# Patient Record
Sex: FEMALE | Race: BLACK OR AFRICAN AMERICAN | Employment: FULL TIME | URBAN - METROPOLITAN AREA
[De-identification: names, ages, dates, MRNs, and addresses within clinical notes are randomized per-mention and may not be internally consistent; named-entity substitution may affect disease eponyms.]

---

## 2017-01-20 PROCEDURE — 87340 HEPATITIS B SURFACE AG IA: CPT | Performed by: OBSTETRICS & GYNECOLOGY

## 2017-01-20 PROCEDURE — 86780 TREPONEMA PALLIDUM: CPT | Performed by: OBSTETRICS & GYNECOLOGY

## 2017-01-20 PROCEDURE — 87389 HIV-1 AG W/HIV-1&-2 AB AG IA: CPT | Performed by: OBSTETRICS & GYNECOLOGY

## 2017-01-20 PROCEDURE — 87591 N.GONORRHOEAE DNA AMP PROB: CPT | Performed by: OBSTETRICS & GYNECOLOGY

## 2017-01-20 PROCEDURE — 36415 COLL VENOUS BLD VENIPUNCTURE: CPT | Performed by: OBSTETRICS & GYNECOLOGY

## 2017-01-20 PROCEDURE — 87491 CHLMYD TRACH DNA AMP PROBE: CPT | Performed by: OBSTETRICS & GYNECOLOGY

## 2018-01-09 ENCOUNTER — APPOINTMENT (OUTPATIENT)
Dept: OCCUPATIONAL MEDICINE | Age: 38
End: 2018-01-09
Attending: EMERGENCY MEDICINE

## 2018-03-30 ENCOUNTER — HOSPITAL ENCOUNTER (EMERGENCY)
Facility: HOSPITAL | Age: 38
Discharge: HOME | End: 2018-03-30
Payer: MEDICAID

## 2018-03-30 ENCOUNTER — APPOINTMENT (OUTPATIENT)
Dept: GENERAL RADIOLOGY | Facility: HOSPITAL | Age: 38
End: 2018-03-30
Payer: MEDICAID

## 2018-03-30 VITALS
TEMPERATURE: 98 F | OXYGEN SATURATION: 97 % | DIASTOLIC BLOOD PRESSURE: 80 MMHG | HEART RATE: 80 BPM | WEIGHT: 245 LBS | BODY MASS INDEX: 38.45 KG/M2 | RESPIRATION RATE: 16 BRPM | SYSTOLIC BLOOD PRESSURE: 133 MMHG | HEIGHT: 67 IN

## 2018-03-30 DIAGNOSIS — M25.561 ACUTE PAIN OF RIGHT KNEE: Primary | ICD-10-CM

## 2018-03-30 PROCEDURE — 99283 EMERGENCY DEPT VISIT LOW MDM: CPT

## 2018-03-30 PROCEDURE — 73560 X-RAY EXAM OF KNEE 1 OR 2: CPT

## 2018-03-31 NOTE — ED INITIAL ASSESSMENT (HPI)
Pt c/o right knee pain, reports ongoing pain since November after injury, states she twisted knee earlier today while shopping.

## 2018-03-31 NOTE — ED NOTES
Assumed care of patient from [facility]OLA [facility]. Discharge instructions/new RX/follow up plan reviewed with patient. She verbalizes understanding and leaves ED in NAD.

## 2018-03-31 NOTE — ED PROVIDER NOTES
Patient Seen in: [facility] AND [facility] Emergency Department    History   Patient presents with:  Knee Pain      HPI    Patient presents to the ED complaining of right knee pain that has been ongoing since November.   No known injury November, pain started spo signs reviewed. Social History and Family History elements reviewed from today, pertinent positives to the presenting problem noted.     Physical Exam     ED Triage Vitals  BP: 133/80 [03/30/18 2124]  Pulse: 83 [03/30/18 2123]  Resp: 18 [03/30/18 2123] blood pressure reading without diagnosis of hypertension on her problem list. to contribute to the complexity of this ED evaluation.     ED Course: Patient presents to the ED with right knee pain, no distress on examination, x-rays negative for acute bony i

## 2018-05-19 ENCOUNTER — OFFICE VISIT (OUTPATIENT)
Dept: INTERNAL MEDICINE CLINIC | Facility: CLINIC | Age: 38
End: 2018-05-19

## 2018-05-19 ENCOUNTER — LAB ENCOUNTER (OUTPATIENT)
Dept: LAB | Facility: HOSPITAL | Age: 38
End: 2018-05-19
Attending: INTERNAL MEDICINE
Payer: MEDICAID

## 2018-05-19 VITALS
HEIGHT: 67.5 IN | SYSTOLIC BLOOD PRESSURE: 132 MMHG | BODY MASS INDEX: 37.76 KG/M2 | RESPIRATION RATE: 20 BRPM | WEIGHT: 243.38 LBS | TEMPERATURE: 98 F | DIASTOLIC BLOOD PRESSURE: 84 MMHG | HEART RATE: 82 BPM

## 2018-05-19 DIAGNOSIS — Z00.00 ROUTINE PHYSICAL EXAMINATION: ICD-10-CM

## 2018-05-19 DIAGNOSIS — M25.561 RIGHT KNEE PAIN, UNSPECIFIED CHRONICITY: ICD-10-CM

## 2018-05-19 DIAGNOSIS — Z00.00 ROUTINE PHYSICAL EXAMINATION: Primary | ICD-10-CM

## 2018-05-19 PROCEDURE — 85025 COMPLETE CBC W/AUTO DIFF WBC: CPT

## 2018-05-19 PROCEDURE — 80061 LIPID PANEL: CPT

## 2018-05-19 PROCEDURE — 82607 VITAMIN B-12: CPT

## 2018-05-19 PROCEDURE — 36415 COLL VENOUS BLD VENIPUNCTURE: CPT

## 2018-05-19 PROCEDURE — 99395 PREV VISIT EST AGE 18-39: CPT | Performed by: INTERNAL MEDICINE

## 2018-05-19 PROCEDURE — 87389 HIV-1 AG W/HIV-1&-2 AB AG IA: CPT

## 2018-05-19 PROCEDURE — 80053 COMPREHEN METABOLIC PANEL: CPT

## 2018-05-19 PROCEDURE — 84443 ASSAY THYROID STIM HORMONE: CPT

## 2018-05-19 RX ORDER — ERGOCALCIFEROL (VITAMIN D2) 10 MCG
400 TABLET ORAL
COMMUNITY
End: 2018-09-06 | Stop reason: ALTCHOICE

## 2018-05-19 RX ORDER — HYDROCODONE BITARTRATE AND ACETAMINOPHEN 5; 325 MG/1; MG/1
1 TABLET ORAL
COMMUNITY
Start: 2015-01-04 | End: 2018-05-19 | Stop reason: ALTCHOICE

## 2018-05-19 NOTE — PROGRESS NOTES
HPI:    Patient ID: Blanco Silva is a 44year old female. HPI  Patient is here requesting a physical exam.  The first time that I have seen her. She saw the gynecologist in January.   In November she was on an airplane flight and developed right knee Packs/day: 0.00      Years: 0.00         Types: Cigarettes  Smokeless tobacco: Never Used                      Alcohol use:  Yes                        Review of Systems   Constitutional: Negative for chills, fa is no tenderness. Musculoskeletal: She exhibits no tenderness or effusion. Lymphadenopathy:     She has no cervical adenopathy. Neurological: She is alert. No cranial nerve deficit. Coordination normal.   Skin: Skin is warm and dry. No rash noted.

## 2018-06-11 ENCOUNTER — OFFICE VISIT (OUTPATIENT)
Dept: ORTHOPEDICS CLINIC | Facility: CLINIC | Age: 38
End: 2018-06-11

## 2018-06-11 DIAGNOSIS — S83.8X1A ACUTE MEDIAL MENISCAL INJURY OF RIGHT KNEE, INITIAL ENCOUNTER: Primary | ICD-10-CM

## 2018-06-11 PROCEDURE — 99243 OFF/OP CNSLTJ NEW/EST LOW 30: CPT | Performed by: ORTHOPAEDIC SURGERY

## 2018-06-11 PROCEDURE — 99212 OFFICE O/P EST SF 10 MIN: CPT | Performed by: ORTHOPAEDIC SURGERY

## 2018-06-11 NOTE — H&P
Chief Complaint: Right knee pain    NURSING INTAKE COMMENTS: Patient presents with:  Consult: Had a knee injury months ago . Got off an airplane and was painful. Right knee. Does not remember any significant injury at the time.    Other: Had an xray in the electronic medical record. Pertinent positives and negatives noted in the the HPI. Physical Examination:  There is no height or weight on file to calculate BMI. This 44year old female is A&O in no acute distress.   KNEE EXAM: LEFT  RIGHT   Range of M

## 2018-06-14 ENCOUNTER — TELEPHONE (OUTPATIENT)
Dept: ORTHOPEDICS CLINIC | Facility: CLINIC | Age: 38
End: 2018-06-14

## 2018-06-14 NOTE — TELEPHONE ENCOUNTER
Pt for MRI  Called [employer] for authrozation  Spoke to Democracia [id number] authorized  Expires 7-29-18  [id number]

## 2018-06-25 ENCOUNTER — HOSPITAL ENCOUNTER (OUTPATIENT)
Dept: MRI IMAGING | Age: 38
Discharge: HOME | End: 2018-06-25
Attending: ORTHOPAEDIC SURGERY
Payer: MEDICAID

## 2018-06-25 DIAGNOSIS — S83.8X1A ACUTE MEDIAL MENISCAL INJURY OF RIGHT KNEE, INITIAL ENCOUNTER: ICD-10-CM

## 2018-06-25 PROCEDURE — 73721 MRI JNT OF LWR EXTRE W/O DYE: CPT | Performed by: ORTHOPAEDIC SURGERY

## 2018-06-26 ENCOUNTER — TELEPHONE (OUTPATIENT)
Dept: ORTHOPEDICS CLINIC | Facility: CLINIC | Age: 38
End: 2018-06-26

## 2018-09-06 ENCOUNTER — OFFICE VISIT (OUTPATIENT)
Dept: ORTHOPEDICS CLINIC | Facility: CLINIC | Age: 38
End: 2018-09-06
Payer: MEDICAID

## 2018-09-06 DIAGNOSIS — S83.8X1A ACUTE MEDIAL MENISCAL INJURY OF RIGHT KNEE, INITIAL ENCOUNTER: Primary | ICD-10-CM

## 2018-09-06 PROCEDURE — 99213 OFFICE O/P EST LOW 20 MIN: CPT | Performed by: ORTHOPAEDIC SURGERY

## 2018-09-06 PROCEDURE — 99212 OFFICE O/P EST SF 10 MIN: CPT | Performed by: ORTHOPAEDIC SURGERY

## 2018-09-06 NOTE — PROGRESS NOTES
Time based billing: total face-to-face time spent examining, counseling and treating this patient 15 minutes; more than 50% of time spent in counseling/coordination of care    Patient presents for follow-up.   She continues to have significant pain and jaquan

## 2020-01-24 ENCOUNTER — APPOINTMENT (OUTPATIENT)
Dept: CT IMAGING | Facility: HOSPITAL | Age: 40
End: 2020-01-24
Attending: EMERGENCY MEDICINE
Payer: COMMERCIAL

## 2020-01-24 ENCOUNTER — HOSPITAL ENCOUNTER (EMERGENCY)
Facility: HOSPITAL | Age: 40
Discharge: HOME | End: 2020-01-25
Attending: EMERGENCY MEDICINE
Payer: COMMERCIAL

## 2020-01-24 VITALS
DIASTOLIC BLOOD PRESSURE: 70 MMHG | HEART RATE: 62 BPM | TEMPERATURE: 98 F | WEIGHT: 250 LBS | BODY MASS INDEX: 39 KG/M2 | RESPIRATION RATE: 18 BRPM | SYSTOLIC BLOOD PRESSURE: 130 MMHG | OXYGEN SATURATION: 98 %

## 2020-01-24 DIAGNOSIS — R09.89 FOREIGN BODY SENSATION IN THROAT: Primary | ICD-10-CM

## 2020-01-24 PROCEDURE — 70490 CT SOFT TISSUE NECK W/O DYE: CPT | Performed by: EMERGENCY MEDICINE

## 2020-01-24 PROCEDURE — 99284 EMERGENCY DEPT VISIT MOD MDM: CPT

## 2020-01-25 NOTE — ED PROVIDER NOTES
Patient Seen in: [facility] AND [facility] Emergency Department      History   Patient presents with:  FB in Throat    Stated Complaint: feels like lump in throat x 3 weeks    HPI    58-year-old female presents to the ER with complaint of foreign body sensation 11/11/2012   SpO2 98%   BMI 38.58 kg/m²         Physical Exam  Vitals signs and nursing note reviewed. Constitutional:       Appearance: Normal appearance. She is normal weight. HENT:      Head: Normocephalic and atraumatic.       Nose: Nose normal.   E

## 2020-01-25 NOTE — ED INITIAL ASSESSMENT (HPI)
Patient aox3 to ed via self patient co of foreign body sensation in lower throat x 3 weeks, patient states feels like it has increased in size. Airway patent no drooling noted. Patient speaking in clear full sentences.  Denies fever

## 2020-01-30 ENCOUNTER — OFFICE VISIT (OUTPATIENT)
Dept: INTERNAL MEDICINE CLINIC | Facility: CLINIC | Age: 40
End: 2020-01-30
Payer: COMMERCIAL

## 2020-01-30 VITALS
SYSTOLIC BLOOD PRESSURE: 144 MMHG | HEIGHT: 67.5 IN | HEART RATE: 75 BPM | DIASTOLIC BLOOD PRESSURE: 91 MMHG | BODY MASS INDEX: 39.07 KG/M2 | WEIGHT: 251.88 LBS

## 2020-01-30 DIAGNOSIS — Z00.00 PHYSICAL EXAM, ANNUAL: ICD-10-CM

## 2020-01-30 DIAGNOSIS — R07.0 THROAT PAIN: ICD-10-CM

## 2020-01-30 DIAGNOSIS — R13.10 DYSPHAGIA, UNSPECIFIED TYPE: Primary | ICD-10-CM

## 2020-01-30 PROCEDURE — 99213 OFFICE O/P EST LOW 20 MIN: CPT | Performed by: NURSE PRACTITIONER

## 2020-01-30 NOTE — PROGRESS NOTES
HPI:    Patient ID: Maco Chapman is a 35year old female. HPI Feels like there is something caught in her throat. Patient went to the ED with a sensation of a foreign body in her throat. This sensation had been going on for a month.  Patient feels th Dysphagia - Primary    Relevant Orders    US THYROID (EYX=[id number])    ENT - INTERNAL       Other    Throat pain     A/P-35year-old -American female who went to the emergency room on 1/24/2020 with complaints that there is something caught in her thro

## 2020-01-31 NOTE — ASSESSMENT & PLAN NOTE
A/P-35year-old -American female who went to the emergency room on 1/24/2020 with complaints that there is something caught in her throat. They did a's CAT scan of her neck and soft tissue and it was unremarkable.   This sensation is been going on f

## 2020-02-25 ENCOUNTER — HOSPITAL ENCOUNTER (OUTPATIENT)
Dept: ULTRASOUND IMAGING | Age: 40
Discharge: HOME | End: 2020-02-25
Attending: NURSE PRACTITIONER
Payer: COMMERCIAL

## 2020-02-25 DIAGNOSIS — R13.10 DYSPHAGIA, UNSPECIFIED TYPE: ICD-10-CM

## 2020-02-25 PROCEDURE — 76536 US EXAM OF HEAD AND NECK: CPT | Performed by: NURSE PRACTITIONER

## 2020-02-27 ENCOUNTER — OFFICE VISIT (OUTPATIENT)
Dept: OTOLARYNGOLOGY | Facility: CLINIC | Age: 40
End: 2020-02-27
Payer: COMMERCIAL

## 2020-02-27 VITALS
BODY MASS INDEX: 39.24 KG/M2 | HEIGHT: 67 IN | DIASTOLIC BLOOD PRESSURE: 88 MMHG | TEMPERATURE: 98 F | WEIGHT: 250 LBS | SYSTOLIC BLOOD PRESSURE: 131 MMHG

## 2020-02-27 DIAGNOSIS — R07.0 THROAT PAIN: Primary | ICD-10-CM

## 2020-02-27 PROCEDURE — 31575 DIAGNOSTIC LARYNGOSCOPY: CPT | Performed by: OTOLARYNGOLOGY

## 2020-02-27 PROCEDURE — 99203 OFFICE O/P NEW LOW 30 MIN: CPT | Performed by: OTOLARYNGOLOGY

## 2020-02-27 RX ORDER — OMEPRAZOLE 40 MG/1
40 CAPSULE, DELAYED RELEASE ORAL DAILY
Qty: 30 CAPSULE | Refills: 2 | Status: SHIPPED | OUTPATIENT
Start: 2020-02-27 | End: 2021-12-27

## 2020-02-27 RX ORDER — AZELASTINE 1 MG/ML
2 SPRAY, METERED NASAL 2 TIMES DAILY
Qty: 1 BOTTLE | Refills: 0 | Status: SHIPPED | OUTPATIENT
Start: 2020-02-27 | End: 2021-12-27

## 2020-02-27 RX ORDER — MONTELUKAST SODIUM 10 MG/1
10 TABLET ORAL NIGHTLY
Qty: 30 TABLET | Refills: 3 | Status: SHIPPED | OUTPATIENT
Start: 2020-02-27 | End: 2021-12-27

## 2020-02-27 NOTE — PROGRESS NOTES
Chelsea Rodriguez is a 35year old female.   Patient presents with:  Throat Problem: pt presents with a feeling something is stuck in throat       HISTORY OF PRESENT ILLNESS  She presents with a history of feeling something stuck in the back of her th vessels of lower extremity    • Bunion    • Elevated blood pressure reading without diagnosis of hypertension    • Other pulmonary embolism and infarction 2012    After hysterectomy.  Rx'd with blood thinner for 6 months   • Sprain and strain of unspecified Normal External nose - Normal. Lips/teeth/gums - Normal. Tonsils - Normal. Oropharynx - Normal.   Ears Normal Inspection - Right: Normal, Left: Normal. Canal - Right: Normal, Left: Normal. TM - Right: Normal, Left: Normal.   Skin Normal Inspection - Normal laryngeal mucosa and vocal cords noted. Significant postnasal discharge witnessed on laryngoscopy. She does have a history of GERD. I have recommended that she start Singulair Loratadine-D Astelin nasal spray and omeprazole return to see me in 1 month.

## 2020-03-14 ENCOUNTER — OFFICE VISIT (OUTPATIENT)
Dept: INTERNAL MEDICINE CLINIC | Facility: CLINIC | Age: 40
End: 2020-03-14
Payer: COMMERCIAL

## 2020-03-14 VITALS
SYSTOLIC BLOOD PRESSURE: 118 MMHG | RESPIRATION RATE: 20 BRPM | DIASTOLIC BLOOD PRESSURE: 76 MMHG | TEMPERATURE: 99 F | HEART RATE: 92 BPM | HEIGHT: 67 IN | BODY MASS INDEX: 38.77 KG/M2 | WEIGHT: 247 LBS

## 2020-03-14 DIAGNOSIS — Z00.00 PHYSICAL EXAM, ANNUAL: Primary | ICD-10-CM

## 2020-03-14 DIAGNOSIS — G47.9 SLEEP DISTURBANCE: ICD-10-CM

## 2020-03-14 PROCEDURE — 99396 PREV VISIT EST AGE 40-64: CPT | Performed by: INTERNAL MEDICINE

## 2020-03-14 NOTE — PROGRESS NOTES
HPI:    Patient ID: Cameron Salazar is a 35year old female. HPI  Patient is here requesting physical exam.  I last saw her couple of years ago. Recently seen for dysphasia.   Had a CT scan in the emergency room which was normal.  Had an [place] CHOLECYSTECTOMY      at age 24   • HYSTERECTOMY  in 2012     abdominal hysterectomy  pt still has fallobian tubes and ovaries       Family History   Problem Relation Age of Onset   • Heart Disease Mother    • Heart Disease Other    • Cancer Other    • Diab 118/76 (BP Location: Right arm, Patient Position: Sitting, Cuff Size: large)   Pulse 92   Temp 99.2 °F (37.3 °C) (Oral)   Resp 20   Ht 5' 7\" (1.702 m)   Wt 247 lb (112 kg)   LMP 11/11/2012   BMI 38.69 kg/m²      Physical Exam    Constitutional: She is obe REFERRAL DIAGOSTIC SLEEP STUDY ADULT  - GENERAL SLEEP STUDY TRANSCRIPTION; Future    3. BMI 38.0-38.9,adult  Spent time discussing the importance of long-term commitment to diet, exercise, weight loss. She is been doing some exercise.   She is to focus mor

## 2020-03-24 ENCOUNTER — TELEPHONE (OUTPATIENT)
Dept: INTERNAL MEDICINE CLINIC | Facility: CLINIC | Age: 40
End: 2020-03-24

## 2020-03-24 DIAGNOSIS — G47.33 OSA (OBSTRUCTIVE SLEEP APNEA): Primary | ICD-10-CM

## 2020-03-24 NOTE — TELEPHONE ENCOUNTER
Hello,     Patient’s Health Plan requires the following information listed below in order to obtain PA for in lab sleep study. Please note that last office note does not include this information.  Please refer patient to Pulmonary or patient can have a home

## 2020-03-25 ENCOUNTER — TELEPHONE (OUTPATIENT)
Dept: OTOLARYNGOLOGY | Facility: CLINIC | Age: 40
End: 2020-03-25

## 2020-03-25 NOTE — TELEPHONE ENCOUNTER
Please note reason for call and advise, ok to order home sleep study? Order for home sleep study pending in EMR.

## 2020-03-25 NOTE — TELEPHONE ENCOUNTER
Patient contacted informed her that order was placed for home sleep center. Patient also given phone number for [facility]. Patient voiced understanding with no concerns.

## 2020-03-30 NOTE — TELEPHONE ENCOUNTER
Hello,    Please note that [facility]'s health plan  requirements for home sleep study are the same as in lab sleep study. Please refer patient to Pulmonary. Thank you, Jb Pineda Specialist    Managed Care.

## 2020-04-01 ENCOUNTER — PATIENT MESSAGE (OUTPATIENT)
Dept: INTERNAL MEDICINE CLINIC | Facility: CLINIC | Age: 40
End: 2020-04-01

## 2020-04-02 RX ORDER — ERGOCALCIFEROL 1.25 MG/1
50000 CAPSULE ORAL WEEKLY
Qty: 12 CAPSULE | Refills: 0 | Status: SHIPPED | OUTPATIENT
Start: 2020-04-02 | End: 2020-05-02

## 2020-04-02 NOTE — TELEPHONE ENCOUNTER
From: Shannan Holland  To: Marisol العراقي MD  Sent: 4/1/2020 1:35 PM CDT  Subject: Test Results Question    Good afternoon,     I am concerned that no one has reached out to provide the results of my blood test. Today I had lab jane resend them vi

## 2020-04-02 NOTE — TELEPHONE ENCOUNTER
Patient called and lab reviewed. (From [facility]). Will start on Vitamin D 50,000 units weekly for 12 weeks.   Repeat Vitamin D level in August.    Sharlene Keen, ANP

## 2020-04-02 NOTE — TELEPHONE ENCOUNTER
Routed to JESSICA Enrique for advise, thanks. Site staff pls f/u on lab results from [facility].   Thanks.

## 2020-10-06 ENCOUNTER — HOSPITAL ENCOUNTER (OUTPATIENT)
Dept: MAMMOGRAPHY | Age: 40
Discharge: HOME | End: 2020-10-06
Attending: OBSTETRICS & GYNECOLOGY
Payer: COMMERCIAL

## 2020-10-06 DIAGNOSIS — Z01.419 ENCOUNTER FOR GYNECOLOGICAL EXAMINATION WITHOUT ABNORMAL FINDING: ICD-10-CM

## 2020-10-06 DIAGNOSIS — Z12.31 ENCOUNTER FOR SCREENING MAMMOGRAM FOR MALIGNANT NEOPLASM OF BREAST: ICD-10-CM

## 2020-10-06 PROCEDURE — 77067 SCR MAMMO BI INCL CAD: CPT | Performed by: OBSTETRICS & GYNECOLOGY

## 2020-10-06 PROCEDURE — 77063 BREAST TOMOSYNTHESIS BI: CPT | Performed by: OBSTETRICS & GYNECOLOGY

## 2020-10-20 ENCOUNTER — HOSPITAL ENCOUNTER (OUTPATIENT)
Dept: MAMMOGRAPHY | Facility: HOSPITAL | Age: 40
Discharge: HOME | End: 2020-10-20
Attending: OBSTETRICS & GYNECOLOGY
Payer: COMMERCIAL

## 2020-10-20 ENCOUNTER — HOSPITAL ENCOUNTER (OUTPATIENT)
Dept: ULTRASOUND IMAGING | Facility: HOSPITAL | Age: 40
Discharge: HOME | End: 2020-10-20
Attending: OBSTETRICS & GYNECOLOGY
Payer: COMMERCIAL

## 2020-10-20 DIAGNOSIS — R92.8 ABNORMAL MAMMOGRAM: ICD-10-CM

## 2020-10-20 PROCEDURE — 76642 ULTRASOUND BREAST LIMITED: CPT | Performed by: OBSTETRICS & GYNECOLOGY

## 2020-10-20 PROCEDURE — 77065 DX MAMMO INCL CAD UNI: CPT | Performed by: OBSTETRICS & GYNECOLOGY

## 2020-10-20 PROCEDURE — 77061 BREAST TOMOSYNTHESIS UNI: CPT | Performed by: OBSTETRICS & GYNECOLOGY

## 2020-10-20 NOTE — IMAGING NOTE
This nurse introduced self and role of breast coordinator. Discussed recommended breast biopsy with patient. Pt was recommended by Dr Daisy Foley have a left breast ultrasound guided biopsy. Tami De La Garza is single has 1 DTR 25. She works in customer service. hours. 60 tablet 3   • Azelastine HCl 0.1 % Nasal Solution 2 sprays by Nasal route 2 (two) times daily. 1 Bottle 0   • Omeprazole 40 MG Oral Capsule Delayed Release Take 1 capsule (40 mg total) by mouth daily.  Before a meal 30 capsule 2       Discussed wit of the placed marker. Educated the patient they will be awake during this procedure and are able to drive themselves home if they wish.   Educated patient that they should eat breakfast and park in green lot and check in with [facility] .  Educate

## 2020-10-29 ENCOUNTER — HOSPITAL ENCOUNTER (OUTPATIENT)
Dept: ULTRASOUND IMAGING | Facility: HOSPITAL | Age: 40
Discharge: HOME | End: 2020-10-29
Attending: OBSTETRICS & GYNECOLOGY
Payer: COMMERCIAL

## 2020-10-29 ENCOUNTER — HOSPITAL ENCOUNTER (OUTPATIENT)
Dept: MAMMOGRAPHY | Facility: HOSPITAL | Age: 40
Discharge: HOME | End: 2020-10-29
Attending: OBSTETRICS & GYNECOLOGY
Payer: COMMERCIAL

## 2020-10-29 VITALS — HEART RATE: 48 BPM | DIASTOLIC BLOOD PRESSURE: 81 MMHG | SYSTOLIC BLOOD PRESSURE: 143 MMHG

## 2020-10-29 DIAGNOSIS — N63.20 BREAST MASS, LEFT: ICD-10-CM

## 2020-10-29 PROCEDURE — 88305 TISSUE EXAM BY PATHOLOGIST: CPT | Performed by: OBSTETRICS & GYNECOLOGY

## 2020-10-29 PROCEDURE — 19083 BX BREAST 1ST LESION US IMAG: CPT | Performed by: OBSTETRICS & GYNECOLOGY

## 2020-10-29 PROCEDURE — 77065 DX MAMMO INCL CAD UNI: CPT | Performed by: OBSTETRICS & GYNECOLOGY

## 2020-10-29 NOTE — PROCEDURES
[facility]  Procedure Note    Barbara Art Patient Status:  Outpatient    1980 MRN [id number]   Location [address] Attending Niyah Pedro MD   Hosp Day # 0 PCP Amy Jean MD     Pro

## 2020-10-30 ENCOUNTER — TELEPHONE (OUTPATIENT)
Dept: MAMMOGRAPHY | Facility: HOSPITAL | Age: 40
End: 2020-10-30

## 2020-10-30 NOTE — TELEPHONE ENCOUNTER
Ms Nivia Closs  is s/p biopsy .   Phoned at 1007 am no answer left message to call back to [phone]

## 2020-10-30 NOTE — IMAGING NOTE
Ms Cortney Levy  is s/p biopsy . Phoned at 1007 am no answer left message to call back to [phone] Re  Ensuring she received results and recommendations from her biopsy. called back at 200  Am and introduced myself as breast coordinator .   Reinforced to brianne

## 2021-10-14 ENCOUNTER — OFFICE VISIT (OUTPATIENT)
Dept: OTOLARYNGOLOGY | Facility: CLINIC | Age: 41
End: 2021-10-14
Payer: COMMERCIAL

## 2021-10-14 VITALS — BODY MASS INDEX: 36.26 KG/M2 | WEIGHT: 231 LBS | HEIGHT: 67 IN

## 2021-10-14 DIAGNOSIS — R07.0 THROAT PAIN: Primary | ICD-10-CM

## 2021-10-14 PROCEDURE — 3008F BODY MASS INDEX DOCD: CPT | Performed by: OTOLARYNGOLOGY

## 2021-10-14 PROCEDURE — 99213 OFFICE O/P EST LOW 20 MIN: CPT | Performed by: OTOLARYNGOLOGY

## 2021-10-14 PROCEDURE — 31575 DIAGNOSTIC LARYNGOSCOPY: CPT | Performed by: OTOLARYNGOLOGY

## 2021-10-14 RX ORDER — AZELASTINE 1 MG/ML
2 SPRAY, METERED NASAL 2 TIMES DAILY
Qty: 30 ML | Refills: 0 | Status: SHIPPED | OUTPATIENT
Start: 2021-10-14

## 2021-10-14 RX ORDER — MONTELUKAST SODIUM 10 MG/1
10 TABLET ORAL NIGHTLY
Qty: 30 TABLET | Refills: 3 | Status: SHIPPED | OUTPATIENT
Start: 2021-10-14 | End: 2021-12-27

## 2021-10-14 NOTE — PROGRESS NOTES
Taylor Duron is a 42year old female. Patient presents with:  Throat Problem: patient is here today for feeling something stuck in her throat. She does not have this as a constant feeling. She does feel alot of tightness and uncomfortableness in her thr Marital status: Single    Tobacco Use      Smoking status: Current Some Day Smoker        Types: Cigarettes      Smokeless tobacco: Never Used    Vaping Use      Vaping Use: Never used    Substance and Sexual Activity      Alcohol use: Yes      Drug use:  Jordon Quiroz (1.702 m)   Wt 231 lb (104.8 kg)   LMP 11/11/2012   BMI 36.18 kg/m²        Constitutional Normal Overall appearance - Normal.   Psychiatric Normal Orientation - Oriented to time, place, person & situation. Appropriate mood and affect.    Neck Exam Normal In Hypopharynx/Larynx:  Epiglottis is normal.  Arytenoids:  Bilateral: Arytenoids are normal.  Vocal folds-false  Bilateral: Vocal folds (false) are normal.  Vocal folds-true  Bilateral: Vocal folds (true) are normal.  Pyriform sinus:  Bilateral: Pyrifo

## 2021-12-27 ENCOUNTER — OFFICE VISIT (OUTPATIENT)
Dept: INTERNAL MEDICINE CLINIC | Facility: CLINIC | Age: 41
End: 2021-12-27
Payer: COMMERCIAL

## 2021-12-27 ENCOUNTER — LAB ENCOUNTER (OUTPATIENT)
Dept: LAB | Facility: HOSPITAL | Age: 41
End: 2021-12-27
Attending: INTERNAL MEDICINE
Payer: COMMERCIAL

## 2021-12-27 VITALS
RESPIRATION RATE: 20 BRPM | WEIGHT: 230 LBS | HEIGHT: 67 IN | DIASTOLIC BLOOD PRESSURE: 84 MMHG | TEMPERATURE: 97 F | BODY MASS INDEX: 36.1 KG/M2 | SYSTOLIC BLOOD PRESSURE: 136 MMHG | HEART RATE: 64 BPM

## 2021-12-27 DIAGNOSIS — Z00.00 ROUTINE PHYSICAL EXAMINATION: ICD-10-CM

## 2021-12-27 DIAGNOSIS — Z00.00 ROUTINE PHYSICAL EXAMINATION: Primary | ICD-10-CM

## 2021-12-27 DIAGNOSIS — R13.10 DYSPHAGIA, UNSPECIFIED TYPE: ICD-10-CM

## 2021-12-27 DIAGNOSIS — M21.961 ACQUIRED DEFORMITY OF RIGHT KNEE: ICD-10-CM

## 2021-12-27 DIAGNOSIS — K59.00 CONSTIPATION, UNSPECIFIED CONSTIPATION TYPE: ICD-10-CM

## 2021-12-27 PROCEDURE — 99396 PREV VISIT EST AGE 40-64: CPT | Performed by: INTERNAL MEDICINE

## 2021-12-27 PROCEDURE — 80061 LIPID PANEL: CPT

## 2021-12-27 PROCEDURE — 82607 VITAMIN B-12: CPT

## 2021-12-27 PROCEDURE — 3008F BODY MASS INDEX DOCD: CPT | Performed by: INTERNAL MEDICINE

## 2021-12-27 PROCEDURE — 3079F DIAST BP 80-89 MM HG: CPT | Performed by: INTERNAL MEDICINE

## 2021-12-27 PROCEDURE — 84443 ASSAY THYROID STIM HORMONE: CPT

## 2021-12-27 PROCEDURE — 3075F SYST BP GE 130 - 139MM HG: CPT | Performed by: INTERNAL MEDICINE

## 2021-12-27 PROCEDURE — 80053 COMPREHEN METABOLIC PANEL: CPT

## 2021-12-27 PROCEDURE — 85025 COMPLETE CBC W/AUTO DIFF WBC: CPT

## 2021-12-27 PROCEDURE — 36415 COLL VENOUS BLD VENIPUNCTURE: CPT

## 2021-12-27 NOTE — PROGRESS NOTES
HPI:    Patient ID: Angelica Perry is a 42year old female. HPI  Patient is here requesting a physical exam.  Last seen in the office by me in March 2020. General physical exam at that time. Concerns about obesity with a weight of 247 pounds.   Also t Mother    • Heart Disease Other    • Cancer Other    • Diabetes Other    • Hypertension Other    • Ovarian Cancer Maternal Cousin Female 48   • Pancreatic Cancer Maternal Cousin Female 48      Social History    Tobacco Use      Smoking status: Current Some Comments: Bony prominence distal to patella along the center line   Lymphadenopathy:      Cervical: No cervical adenopathy. Skin:     General: Skin is warm and dry. Findings: No rash. Neurological:      Mental Status: She is alert.       Cranial right knee  Patient with bony prominence distal to the right knee. I do not think it is anything to worry about. If it bothers her, she can see the orthopedic doctor.   - ORTHOPEDIC - INTERNAL         Meds This Visit:  Requested Prescriptions      No pres

## 2021-12-27 NOTE — PATIENT INSTRUCTIONS
Constipation (Adult)  Constipation means that you have bowel movements that are less frequent than usual. Stools often become very hard and difficult to pass. Constipation is very common. At some point in life, it affects almost everyone.  Since everyone may also need medicines. Your healthcare provider will tell you which will work best for you. Follow the advice below to help avoid this problem in the future. Lifestyle changes  These lifestyle changes can help prevent constipation:  · Diet.  Eat a high-f vomiting  · Swelling in your abdomen  · Blood in the stool  · Black, tarry stool  · Involuntary weight loss  · Weakness  Masood last reviewed this educational content on 6/1/2018 © 2000-2021 [employer]. All rights reserved.  This informati laxatives. Laxatives, if used as directed, are common and safe. Follow directions carefully when using them. See your provider for new-onset constipation, or long-term constipation, to rule out other causes such as medicines or thyroid disease.    2 Kaylin Cowan

## 2022-04-19 ENCOUNTER — OFFICE VISIT (OUTPATIENT)
Dept: ORTHOPEDICS CLINIC | Facility: CLINIC | Age: 42
End: 2022-04-19
Payer: COMMERCIAL

## 2022-04-19 ENCOUNTER — HOSPITAL ENCOUNTER (OUTPATIENT)
Dept: GENERAL RADIOLOGY | Facility: HOSPITAL | Age: 42
Discharge: HOME | End: 2022-04-19
Attending: ORTHOPAEDIC SURGERY
Payer: COMMERCIAL

## 2022-04-19 DIAGNOSIS — M25.561 RIGHT KNEE PAIN, UNSPECIFIED CHRONICITY: ICD-10-CM

## 2022-04-19 DIAGNOSIS — M92.521 OSGOOD-SCHLATTER'S DISEASE OF RIGHT LOWER EXTREMITY: Primary | ICD-10-CM

## 2022-04-19 PROCEDURE — 99203 OFFICE O/P NEW LOW 30 MIN: CPT | Performed by: ORTHOPAEDIC SURGERY

## 2022-04-19 PROCEDURE — 73562 X-RAY EXAM OF KNEE 3: CPT | Performed by: ORTHOPAEDIC SURGERY

## 2022-04-19 RX ORDER — MELOXICAM 15 MG/1
15 TABLET ORAL DAILY
Qty: 30 TABLET | Refills: 0 | Status: SHIPPED | OUTPATIENT
Start: 2022-04-19

## 2022-06-20 ENCOUNTER — PATIENT MESSAGE (OUTPATIENT)
Dept: INTERNAL MEDICINE CLINIC | Facility: CLINIC | Age: 42
End: 2022-06-20

## 2022-06-20 ENCOUNTER — TELEPHONE (OUTPATIENT)
Dept: INTERNAL MEDICINE CLINIC | Facility: CLINIC | Age: 42
End: 2022-06-20

## 2022-06-21 ENCOUNTER — NURSE TRIAGE (OUTPATIENT)
Dept: INTERNAL MEDICINE CLINIC | Facility: CLINIC | Age: 42
End: 2022-06-21

## 2022-06-21 NOTE — TELEPHONE ENCOUNTER
Future Appointments   Date Time Provider Tyrese Gaytan   6/22/2022  8:00 AM MASON Stallworth [facility] [place]     Closing encounter, please see Nurse Triage message from 6/21/22

## 2022-06-21 NOTE — TELEPHONE ENCOUNTER
See acute encounter 6/20/22    No future appointments. From: Eliel Vegrara  To: Robb Valencia MD  Sent: 6/20/2022  4:35 PM CDT  Subject: Discomfort from shoulder to tip of pinky finger     Hello,     About 3 weeks ago I woke with what I thought was a crook in my neck. After several days I noticed that the pain didn't go away and it felt like there was a muscle that just wouldn't relax. I got a message and the pain relieved only a little bit. I am having trouble sleeping and now noticed that the I have numbness down from my elbow to the tips of my left pinky and ring finger. Please tell me what I should do? When can I make an appointment?

## 2022-06-21 NOTE — TELEPHONE ENCOUNTER
----- Message from Silviano Delgado RN sent at 6/20/2022  6:51 PM CDT -----  Regarding: FW: Discomfort from shoulder to tip of pinky finger       ----- Message -----  From: Jessa Mendoza  Sent: 6/20/2022   4:35 PM CDT  To: Alana Rn Triage  Subject: Discomfort from shoulder to tip of pinky fin#    Hello,     About 3 weeks ago I woke with what I thought was a crook in my neck. After several days I noticed that the pain didn't go away and it felt like there was a muscle that just wouldn't relax. I got a message and the pain relieved only a little bit. I am having trouble sleeping and now noticed that the I have numbness down from my elbow to the tips of my left pinky and ring finger. Please tell me what I should do? When can I make an appointment?

## 2022-06-22 ENCOUNTER — OFFICE VISIT (OUTPATIENT)
Dept: INTERNAL MEDICINE CLINIC | Facility: CLINIC | Age: 42
End: 2022-06-22
Payer: COMMERCIAL

## 2022-06-22 VITALS
BODY MASS INDEX: 35.31 KG/M2 | DIASTOLIC BLOOD PRESSURE: 89 MMHG | HEART RATE: 65 BPM | HEIGHT: 67 IN | WEIGHT: 225 LBS | SYSTOLIC BLOOD PRESSURE: 133 MMHG

## 2022-06-22 DIAGNOSIS — M54.2 NECK PAIN: Primary | ICD-10-CM

## 2022-06-22 DIAGNOSIS — R20.2 NUMBNESS AND TINGLING: ICD-10-CM

## 2022-06-22 DIAGNOSIS — R20.0 NUMBNESS AND TINGLING: ICD-10-CM

## 2022-06-22 PROCEDURE — 3075F SYST BP GE 130 - 139MM HG: CPT | Performed by: NURSE PRACTITIONER

## 2022-06-22 PROCEDURE — 99214 OFFICE O/P EST MOD 30 MIN: CPT | Performed by: NURSE PRACTITIONER

## 2022-06-22 PROCEDURE — 3008F BODY MASS INDEX DOCD: CPT | Performed by: NURSE PRACTITIONER

## 2022-06-22 PROCEDURE — 3079F DIAST BP 80-89 MM HG: CPT | Performed by: NURSE PRACTITIONER

## 2022-06-22 RX ORDER — TIZANIDINE 2 MG/1
TABLET ORAL
Qty: 40 TABLET | Refills: 0 | Status: SHIPPED | OUTPATIENT
Start: 2022-06-22

## 2022-06-22 NOTE — PATIENT INSTRUCTIONS
1) Sleep with a rolled towel under your neck, no pillow, on your back on a firm mattress    2) Sleep on your back    3) Bengay vanishing scent rub in

## 2022-06-30 ENCOUNTER — TELEPHONE (OUTPATIENT)
Dept: INTERNAL MEDICINE CLINIC | Facility: CLINIC | Age: 42
End: 2022-06-30

## 2022-06-30 ENCOUNTER — PATIENT MESSAGE (OUTPATIENT)
Dept: INTERNAL MEDICINE CLINIC | Facility: CLINIC | Age: 42
End: 2022-06-30

## 2022-06-30 NOTE — TELEPHONE ENCOUNTER
Patient called back asking for EEG for the numbness to it is worse now from elbow to finger on left hand pinky/ring finger    Has not completed X-ray yet, will make appointment

## 2022-07-01 ENCOUNTER — HOSPITAL ENCOUNTER (OUTPATIENT)
Dept: GENERAL RADIOLOGY | Facility: HOSPITAL | Age: 42
Discharge: HOME | End: 2022-07-01
Attending: NURSE PRACTITIONER
Payer: COMMERCIAL

## 2022-07-01 DIAGNOSIS — M54.2 NECK PAIN: ICD-10-CM

## 2022-07-01 PROCEDURE — 72050 X-RAY EXAM NECK SPINE 4/5VWS: CPT | Performed by: NURSE PRACTITIONER

## 2022-07-05 ENCOUNTER — TELEPHONE (OUTPATIENT)
Dept: INTERNAL MEDICINE CLINIC | Facility: CLINIC | Age: 42
End: 2022-07-05

## 2022-07-05 DIAGNOSIS — M54.2 NECK PAIN: Primary | ICD-10-CM

## 2022-07-12 ENCOUNTER — TELEMEDICINE (OUTPATIENT)
Dept: INTERNAL MEDICINE CLINIC | Facility: CLINIC | Age: 42
End: 2022-07-12
Payer: COMMERCIAL

## 2022-07-12 DIAGNOSIS — R20.0 NUMBNESS AND TINGLING: Primary | ICD-10-CM

## 2022-07-12 DIAGNOSIS — R20.2 NUMBNESS AND TINGLING: Primary | ICD-10-CM

## 2022-07-12 PROCEDURE — 99213 OFFICE O/P EST LOW 20 MIN: CPT | Performed by: NURSE PRACTITIONER

## 2022-07-12 RX ORDER — GABAPENTIN 300 MG/1
300 CAPSULE ORAL NIGHTLY
Qty: 30 CAPSULE | Refills: 5 | Status: SHIPPED | OUTPATIENT
Start: 2022-07-12

## 2022-07-12 NOTE — TELEPHONE ENCOUNTER
Petra Rocha RN 7/12/2022 3:16 PM CDT        ----- Message -----  From: Merced Andrade  Sent: 7/12/2022 3:08 PM CDT  To: Em Rn Triage  Subject: Neck X-ray     hi there, I was told I would get text to my phone

## 2022-07-12 NOTE — ASSESSMENT & PLAN NOTE
Continues with tingling and numbness from left elbow to pinky finger. Plan  Discontinue muscle relaxer. Start   gabapentin 300 MG Oral Cap Take 1 capsule (300 mg total) by mouth nightly.    Dr Shawanda Ying specialist- [phone]  EMG

## 2022-07-15 ENCOUNTER — TELEPHONE (OUTPATIENT)
Dept: PHYSICAL THERAPY | Facility: HOSPITAL | Age: 42
End: 2022-07-15

## 2022-07-18 ENCOUNTER — OFFICE VISIT (OUTPATIENT)
Dept: PHYSICAL THERAPY | Age: 42
End: 2022-07-18
Attending: NURSE PRACTITIONER
Payer: COMMERCIAL

## 2022-07-18 DIAGNOSIS — M54.2 NECK PAIN: ICD-10-CM

## 2022-07-18 PROCEDURE — 97140 MANUAL THERAPY 1/> REGIONS: CPT

## 2022-07-18 PROCEDURE — 97161 PT EVAL LOW COMPLEX 20 MIN: CPT

## 2022-07-18 PROCEDURE — 97014 ELECTRIC STIMULATION THERAPY: CPT

## 2022-07-20 ENCOUNTER — OFFICE VISIT (OUTPATIENT)
Dept: PHYSICAL THERAPY | Age: 42
End: 2022-07-20
Attending: NURSE PRACTITIONER
Payer: COMMERCIAL

## 2022-07-20 PROCEDURE — 97014 ELECTRIC STIMULATION THERAPY: CPT

## 2022-07-20 PROCEDURE — 97140 MANUAL THERAPY 1/> REGIONS: CPT

## 2022-07-20 PROCEDURE — 97110 THERAPEUTIC EXERCISES: CPT

## 2022-07-20 NOTE — PROGRESS NOTES
Diagnosis:   [place]      Referring Provider: Avel Lynn  Date of Evaluation:    7/18/2022    Precautions:  None Next MD visit:   none scheduled  Date of Surgery: n/a   Insurance Primary/Secondary: [employer] / N/A     # Auth Visits: 12            Subjective: Improving. DIlligent with HEP. Inquires about work station. Pain: 4/10      Objective:   ULTT postive in ULTT      Assessment: Continued lower cervical mobs. Irritability lowering, added scap strengthening today and pt demonstrates competence with UT inhibition. Goals:   (to be met in 12 visits)    1) Competent with HEP. 2) Scap and chin retracted posture 90% of the time in clinic w/o cues. 3) 5/5 mid and low trap strength for better posture. 4) Full return to work and ADL function without symptoms above 1/10. Plan: Continues lower cervical mobility; consider manioulation   Date: 7/18/2022  Tx#: 1/12 Date:   TX#: 2/12 Date:                 TX#: 3/ Date:                 TX#: 4/ Date:                 TX#: 5/ Date: Tx#: 6/   Manual -Cervical traction isolated at lower cervical  -Passive ulnar glides  -STM upper traps -Cervical traction isolated at lower cervical  -C6-7 side glides  -STM upper traps       Therex -Sword draws  -Chin tucks -Chin tucks supine  -Banded RTB retractions  -Banded ER       Neuro         E-stim 15 minutes IFC 40% 15 minutes IFC         HEP: Chin retractions, quadraped thoracic rot.     Charges: therex, manualx2 e-stim       Total Timed Treatment: 45 min  Total Treatment Time: 60 min

## 2022-07-27 ENCOUNTER — OFFICE VISIT (OUTPATIENT)
Dept: PHYSICAL THERAPY | Age: 42
End: 2022-07-27
Attending: NURSE PRACTITIONER
Payer: COMMERCIAL

## 2022-07-27 PROCEDURE — 97014 ELECTRIC STIMULATION THERAPY: CPT

## 2022-07-27 PROCEDURE — 97110 THERAPEUTIC EXERCISES: CPT

## 2022-07-27 PROCEDURE — 97140 MANUAL THERAPY 1/> REGIONS: CPT

## 2022-07-27 NOTE — PROGRESS NOTES
Diagnosis:   Chiqui [place]      Referring Provider: Lulu Peter  Date of Evaluation:    7/18/2022    Precautions:  None Next MD visit:   none scheduled  Date of Surgery: n/a   Insurance Primary/Secondary: Cherryl Duane / N/A     # Auth Visits: 12            Subjective: States feeling is returning in [place]. Pain: 4/10      Objective:   ULTT postive in ULTT      Assessment: Lower cervical mobs and t-spine mobilization effective for pain reduction. Progressed resistance of scap exercises. Pt doing very well. Educate to avoid elbows on desk posture to reduce compression of ulnar nerve. Goals:   (to be met in 12 visits)    1) Competent with HEP. 2) Scap and chin retracted posture 90% of the time in clinic w/o cues. 3) 5/5 mid and low trap strength for better posture. 4) Full return to work and ADL function without symptoms above 1/10. Plan: Continues lower cervical mobility; consider manioulation   Date: 7/18/2022  Tx#: 1/12 Date:   TX#: 2/12 Date:                 TX#: 3/12 Date:                 TX#: 4/ Date:                 TX#: 5/ Date: Tx#: 6/   Manual -Cervical traction isolated at lower cervical  -Passive ulnar glides  -STM upper traps -Cervical traction isolated at lower cervical  -C6-7 side glides  -STM upper traps -Cervical traction isolated at lower cervical  -C6-7 side glides  -STM upper traps  -T-spine transverse mobs prone      Therex -Sword draws  -Chin tucks -Chin tucks supine  -Banded RTB retractions  -Banded ER -Chin tucks supine  -Banded GTB retractions  -Banded ER  -Open books        Neuro         E-stim 15 minutes IFC 40% 15 minutes IFC 15 minutes IFC        HEP: Chin retractions, quadraped thoracic rot.     Charges: therex, manualx2 e-stim       Total Timed Treatment: 45 min  Total Treatment Time: 60 min

## 2022-07-29 ENCOUNTER — OFFICE VISIT (OUTPATIENT)
Dept: PHYSICAL THERAPY | Age: 42
End: 2022-07-29
Attending: NURSE PRACTITIONER
Payer: COMMERCIAL

## 2022-07-29 PROCEDURE — 97110 THERAPEUTIC EXERCISES: CPT

## 2022-07-29 PROCEDURE — 97140 MANUAL THERAPY 1/> REGIONS: CPT

## 2022-07-29 PROCEDURE — 97014 ELECTRIC STIMULATION THERAPY: CPT

## 2022-07-29 PROCEDURE — 97112 NEUROMUSCULAR REEDUCATION: CPT

## 2022-07-29 NOTE — PROGRESS NOTES
Diagnosis:   M54.2      Referring Provider: Citlali Farooq  Date of Evaluation:    7/18/2022    Precautions:  None Next MD visit:   none scheduled  Date of Surgery: n/a   Insurance Primary/Secondary: Vanessa Skelton / N/A     # Auth Visits: 12            Subjective: Doing well. Harder time sleeping the last two nights. Has been busy with catering. Pain: 4/10      Objective:   ULTT postive in ULTT      Assessment:Cervical manipulation highly effective in reducing symptoms. Followed with repeated extensions in retraction which serves to briefly peripheralize but ultimately centralize symptoms. Goals:   (to be met in 12 visits)    1) Competent with HEP. 2) Scap and chin retracted posture 90% of the time in clinic w/o cues. 3) 5/5 mid and low trap strength for better posture. 4) Full return to work and ADL function without symptoms above 1/10. Plan: Continues lower cervical mobility; consider manioulation   Date: 7/18/2022  Tx#: 1/12 Date:   TX#: 2/12 Date:                 TX#: 3/12 Date:                 TX#: 4/ Date:                 TX#: 5/ Date:    Tx#: 6/   Manual -Cervical traction isolated at lower cervical  -Passive ulnar glides  -STM upper traps -Cervical traction isolated at lower cervical  -C6-7 side glides  -STM upper traps -Cervical traction isolated at lower cervical  -C6-7 side glides  -STM upper traps  -T-spine transverse mobs prone -Cervical traction isolated at lower cervical  -C6-7 side glides  -STM upper traps  -T-spine transverse mobs prone  -Low cervical rotational grade 5     Therex -Sword draws  -Chin tucks -Chin tucks supine  -Banded RTB retractions  -Banded ER -Chin tucks supine  -Banded GTB retractions  -Banded ER  -Open books   -Chin tucks supine  -Banded GTB retractions  -Banded ER  -Open books     Neuro    -Chin tucks in end rnge retraction-Repeated pro/retractions       E-stim 15 minutes IFC 40% 15 minutes IFC 15 minutes IFC IFC 15 minutes heat       HEP: Chin retractions, quadraped thoracic rot.    Charges: therex 12 minutes, manualx2 (25min), Neuro (8 minutes) e-stim       Total Timed Treatment: 45 min  Total Treatment Time: 60 min

## 2022-08-01 ENCOUNTER — OFFICE VISIT (OUTPATIENT)
Dept: PHYSICAL THERAPY | Age: 42
End: 2022-08-01
Attending: NURSE PRACTITIONER
Payer: COMMERCIAL

## 2022-08-01 PROCEDURE — 97112 NEUROMUSCULAR REEDUCATION: CPT

## 2022-08-01 PROCEDURE — 97140 MANUAL THERAPY 1/> REGIONS: CPT

## 2022-08-01 PROCEDURE — 97110 THERAPEUTIC EXERCISES: CPT

## 2022-08-01 PROCEDURE — 97014 ELECTRIC STIMULATION THERAPY: CPT

## 2022-08-01 NOTE — PROGRESS NOTES
Diagnosis:   M54.2      Referring Provider: Jamil Stringer  Date of Evaluation:    7/18/2022    Precautions:  None Next MD visit:   none scheduled  Date of Surgery: n/a   Insurance Primary/Secondary: [employer] / N/A     # Auth Visits: 12            Subjective: Happy with progress; finger still feels different but no longer numb. Pain: 4/10      Objective:   ULTT postive in ULTT  Rotation equal B      Assessment: Irritability continues to lower. Symptoms centralizing to neck. Progressing motor control and continuing education on continued movemnt and postural check throughout work day. Goals:   (to be met in 12 visits)    1) Competent with HEP. 2) Scap and chin retracted posture 90% of the time in clinic w/o cues. 3) 5/5 mid and low trap strength for better posture. 4) Full return to work and ADL function without symptoms above 1/10. Plan: Continues lower cervical mobility; consider manioulation   Date: 7/18/2022  Tx#: 1/12 Date:   TX#: 2/12 Date:                 TX#: 3/12 Date:                 TX#: 4/ Date:                 TX#: 5/ Date:    Tx#: 6/   Manual -Cervical traction isolated at lower cervical  -Passive ulnar glides  -STM upper traps -Cervical traction isolated at lower cervical  -C6-7 side glides  -STM upper traps -Cervical traction isolated at lower cervical  -C6-7 side glides  -STM upper traps  -T-spine transverse mobs prone -Cervical traction isolated at lower cervical  -C6-7 side glides  -STM upper traps  -T-spine transverse mobs prone  -Low cervical rotational grade 5 -Cervical traction isolated at lower cervical  -C6-7 side glides  -STM upper traps  -T-spine transverse mobs prone  -Low cervical rotational grade 5    Therex -Sword draws  -Chin tucks -Chin tucks supine  -Banded RTB retractions  -Banded ER -Chin tucks supine  -Banded GTB retractions  -Banded ER  -Open books   -Chin tucks supine  -Banded GTB retractions  -Banded ER  -Open books -Chin tucks supine  -Banded GTB retractions  -Banded ER  -Open books    Neuro    -Chin tucks in end rnge retraction-Repeated pro/retractions   -Chin tucks in end range retraction-Repeated pro/retractions  -Scap pro/retractions supine; serratus recruitment    E-stim 15 minutes IFC 40% 15 minutes IFC 15 minutes IFC IFC 15 minutes heat IFC 15 minutes heat      HEP: Chin retractions, quadraped thoracic rot. , postural checks    Charges: therex 1o minutes, manualx2 (25min), Neuro (10 minutes) e-stim       Total Timed Treatment: 45 min  Total Treatment Time: 60 min

## 2022-08-03 ENCOUNTER — OFFICE VISIT (OUTPATIENT)
Dept: PHYSICAL THERAPY | Age: 42
End: 2022-08-03
Attending: NURSE PRACTITIONER
Payer: COMMERCIAL

## 2022-08-03 PROCEDURE — 97140 MANUAL THERAPY 1/> REGIONS: CPT

## 2022-08-03 PROCEDURE — 97014 ELECTRIC STIMULATION THERAPY: CPT

## 2022-08-03 PROCEDURE — 97112 NEUROMUSCULAR REEDUCATION: CPT

## 2022-08-03 PROCEDURE — 97110 THERAPEUTIC EXERCISES: CPT

## 2022-08-03 NOTE — PROGRESS NOTES
Diagnosis:   M54.2      Referring Provider: Aron Wilson  Date of Evaluation:    7/18/2022    Precautions:  None Next MD visit:   none scheduled  Date of Surgery: n/a   Insurance Primary/Secondary: Carina Sargnet / N/A     # Auth Visits: 12            Subjective: Doing well today. Pain: 4/10      Objective:   ULTT postive in ULTT  Rotation equal B      Assessment: Progressed retractions to retraction with extension and rotation. Pt doing very well. SYmptoms almos entirely centralized and doing well at work. Next week will likely be her last two appts. Goals:   (to be met in 12 visits)    1) Competent with HEP. 2) Scap and chin retracted posture 90% of the time in clinic w/o cues. 3) 5/5 mid and low trap strength for better posture. 4) Full return to work and ADL function without symptoms above 1/10. Plan: Continues lower cervical mobility; consider manioulation   Date: 7/18/2022  Tx#: 1/12 Date:   TX#: 2/12 Date:                 TX#: 3/12 Date:                 TX#: 4/ Date:                 TX#: 5/ Date:    Tx#: 6/   Manual -Cervical traction isolated at lower cervical  -Passive ulnar glides  -STM upper traps -Cervical traction isolated at lower cervical  -C6-7 side glides  -STM upper traps -Cervical traction isolated at lower cervical  -C6-7 side glides  -STM upper traps  -T-spine transverse mobs prone -Cervical traction isolated at lower cervical  -C6-7 side glides  -STM upper traps  -T-spine transverse mobs prone  -Low cervical rotational grade 5 -Cervical traction isolated at lower cervical  -C6-7 side glides  -STM upper traps  -T-spine transverse mobs prone  -Low cervical rotational grade 5 -Cervical traction isolated at lower cervical  -C6-7 side glides  -STM upper traps  -T-spine transverse mobs prone  -Low cervical rotational grade 5   Therex -Sword draws  -Chin tucks -Chin tucks supine  -Banded RTB retractions  -Banded ER -Chin tucks supine  -Banded GTB retractions  -Banded ER  -Open books   -Chin tucks supine  -Banded GTB retractions  -Banded ER  -Open books -Chin tucks supine  -Banded GTB retractions  -Banded ER  -Open books -Chin tucks supine  -Banded GTB retractions  -Banded ER  -Open books   Neuro    -Chin tucks in end rnge retraction-Repeated pro/retractions   -Chin tucks in end range retraction-Repeated pro/retractions  -Scap pro/retractions supine; serratus recruitment -Chin tucks in end range retraction-Repeated pro/retractions  -Added rotation today as well  -Scap pro/retractions supine; serratus recruitment   E-stim 15 minutes IFC 40% 15 minutes IFC 15 minutes IFC IFC 15 minutes heat IFC 15 minutes heat IFC 15 minutes heat     HEP: Chin retractions, quadraped thoracic rot. , postural checks    Charges: therex x1 8\ minutes, manualx2 (25min), Neuro x1 (12 minutes) e-stim       Total Timed Treatment: 45 min  Total Treatment Time: 60 min

## 2022-08-08 ENCOUNTER — OFFICE VISIT (OUTPATIENT)
Dept: PHYSICAL THERAPY | Age: 42
End: 2022-08-08
Attending: NURSE PRACTITIONER
Payer: COMMERCIAL

## 2022-08-08 PROCEDURE — 97014 ELECTRIC STIMULATION THERAPY: CPT

## 2022-08-08 PROCEDURE — 97110 THERAPEUTIC EXERCISES: CPT

## 2022-08-08 PROCEDURE — 97140 MANUAL THERAPY 1/> REGIONS: CPT

## 2022-08-08 PROCEDURE — 97112 NEUROMUSCULAR REEDUCATION: CPT

## 2022-08-08 NOTE — PROGRESS NOTES
Diagnosis:   M54.2      Referring Provider: Arsenio Issa  Date of Evaluation:    7/18/2022    Precautions:  None Next MD visit:   none scheduled  Date of Surgery: n/a   Insurance Primary/Secondary: [employer] / N/A     # Auth Visits: 12            Subjective: No numbness, occasional tingling. Sleeping on stomach can activate symptoms per pt. Pain: 2/10      Objective:   ULTT postive in ULTT  Rotation equal B      Assessment: Continued tspine mobility. Pt irritability and severity low. Likely DC next visit. Goals:   (to be met in 12 visits)    1) Competent with HEP. 2) Scap and chin retracted posture 90% of the time in clinic w/o cues. 3) 5/5 mid and low trap strength for better posture. 4) Full return to work and ADL function without symptoms above 1/10. Plan: Continues lower cervical mobility and thoracic spine mobility   Date: 8/8/22  Tx#: 6/   Manual -Cervical traction isolated at lower cervical  -C6-7 side glides  -STM upper traps  -T-spine transverse mobs prone  -Low cervical rotational grade 5   Therex -Chin tucks supine  -Banded GTB retractions  -Banded ER  -Open books   Neuro -Chin tucks in end range retraction-Repeated pro/retractions  -Added rotation today as well  -Scap pro/retractions supine; serratus recruitment   E-stim IFC 15 minutes heat     HEP: Chin retractions, quadraped thoracic rot. , postural checks    Charges: therex x1 8\ minutes, manualx2 (25min), Neuro x1 (12 minutes) e-stim       Total Timed Treatment: 45 min  Total Treatment Time: 60 min

## 2022-08-10 ENCOUNTER — OFFICE VISIT (OUTPATIENT)
Dept: PHYSICAL THERAPY | Age: 42
End: 2022-08-10
Attending: NURSE PRACTITIONER
Payer: COMMERCIAL

## 2022-08-10 PROCEDURE — 97014 ELECTRIC STIMULATION THERAPY: CPT

## 2022-08-10 PROCEDURE — 97110 THERAPEUTIC EXERCISES: CPT

## 2022-08-10 PROCEDURE — 97112 NEUROMUSCULAR REEDUCATION: CPT

## 2022-08-10 PROCEDURE — 97140 MANUAL THERAPY 1/> REGIONS: CPT

## 2022-08-10 NOTE — PROGRESS NOTES
.progress  Discharge Summary  Pt has attended 8, cancelled 0, and no shown 0 visits in Physical Therapy. Subjective: Pt very happy with progress. Tingling is bvery rare now and much less severe. Assessment: Pt seen 8 visits addressing inflammation ocntrol, postural interventions, scapulothoracic motor control, and low cervical monility. N/T has largely resolved and pt is working almost pain free. SHe is happy with progress and has met goals. DC goals met. Objective: Cervical ROM WFL  ULTT ulnar negative  UE strength testing all 5/5    Goals:   (to be met in 12 visits)    1) Competent with HEP. Met  2) Scap and chin retracted posture 90% of the time in clinic w/o cues. Met  3) 5/5 mid and low trap strength for better posture. Met  4) Full return to work and ADL function without symptoms above 1/10. Met    Rehab Potential: good    Plan:DC PT    Patient/Family/Caregiver was advised of these findings, precautions, and treatment options and has agreed to actively participate in planning and for this course of care. Thank you for your referral. If you have any questions, please contact me at Dept: [phone]. Sincerely,  Electronically signed by therapist: Johan Bravo    Physician's certification required: No  Please co-sign or sign and return this letter via fax as soon as possible to [phone]. I certify the need for these services furnished under this plan of treatment and while under my care.     X___________________________________________________ Date____________________    Certification From: 4/28/9139  To:11/8/2022

## 2022-11-14 ENCOUNTER — HOSPITAL ENCOUNTER (EMERGENCY)
Facility: HOSPITAL | Age: 42
Discharge: HOME | End: 2022-11-15
Attending: EMERGENCY MEDICINE
Payer: COMMERCIAL

## 2022-11-14 ENCOUNTER — APPOINTMENT (OUTPATIENT)
Dept: ULTRASOUND IMAGING | Facility: HOSPITAL | Age: 42
End: 2022-11-14
Attending: EMERGENCY MEDICINE
Payer: COMMERCIAL

## 2022-11-14 DIAGNOSIS — I82.4Z2 DVT, LOWER EXTREMITY, DISTAL, ACUTE, LEFT (HCC): Primary | ICD-10-CM

## 2022-11-14 PROCEDURE — 99283 EMERGENCY DEPT VISIT LOW MDM: CPT

## 2022-11-14 PROCEDURE — 93971 EXTREMITY STUDY: CPT | Performed by: EMERGENCY MEDICINE

## 2022-11-15 ENCOUNTER — TELEPHONE (OUTPATIENT)
Dept: INTERNAL MEDICINE CLINIC | Facility: CLINIC | Age: 42
End: 2022-11-15

## 2022-11-15 VITALS
SYSTOLIC BLOOD PRESSURE: 115 MMHG | HEART RATE: 64 BPM | OXYGEN SATURATION: 99 % | RESPIRATION RATE: 16 BRPM | DIASTOLIC BLOOD PRESSURE: 78 MMHG | TEMPERATURE: 98 F

## 2022-11-16 NOTE — TELEPHONE ENCOUNTER
Patient called back. The pharmacy would not fill her script. She has a DVT and a script was given by the ED doctor. They need prior authorization. to fill script. Called [phone]  #[id number]. Long drawn out process asking many questions by robot phone. # for determination. Case ID Approval  [id number]  10/16/2022- 11/15/2023.     Hilda PINEDA

## 2022-11-17 ENCOUNTER — OFFICE VISIT (OUTPATIENT)
Dept: INTERNAL MEDICINE CLINIC | Facility: CLINIC | Age: 42
End: 2022-11-17
Payer: COMMERCIAL

## 2022-11-17 VITALS
BODY MASS INDEX: 37.51 KG/M2 | SYSTOLIC BLOOD PRESSURE: 121 MMHG | WEIGHT: 239 LBS | HEIGHT: 67 IN | HEART RATE: 65 BPM | DIASTOLIC BLOOD PRESSURE: 85 MMHG

## 2022-11-17 DIAGNOSIS — I82.462 ACUTE DEEP VEIN THROMBOSIS (DVT) OF CALF MUSCLE VEIN OF LEFT LOWER EXTREMITY (HCC): ICD-10-CM

## 2022-11-17 DIAGNOSIS — I82.90 THROMBOSIS: Primary | ICD-10-CM

## 2022-11-17 PROCEDURE — 3079F DIAST BP 80-89 MM HG: CPT | Performed by: NURSE PRACTITIONER

## 2022-11-17 PROCEDURE — 3074F SYST BP LT 130 MM HG: CPT | Performed by: NURSE PRACTITIONER

## 2022-11-17 PROCEDURE — 3008F BODY MASS INDEX DOCD: CPT | Performed by: NURSE PRACTITIONER

## 2022-11-17 PROCEDURE — 99214 OFFICE O/P EST MOD 30 MIN: CPT | Performed by: NURSE PRACTITIONER

## 2022-11-17 NOTE — ASSESSMENT & PLAN NOTE
Patient was standing on her feet all day. She noticed swelling in her left calf. She went to the emergency room and she had a DVT in her left peroneal vein. She has history of DVT post hysterectomy. Her grandmother had sudden death at age 29. With this history I would recommend lifelong anticoagulation. She is approved for Eliquis for 1 year. We discussed seeing hematologist for further evaluation of abnormal clotting.     Plan  Patient to see Dr. Elisa Mora

## 2022-11-22 ENCOUNTER — OFFICE VISIT (OUTPATIENT)
Dept: OTOLARYNGOLOGY | Facility: CLINIC | Age: 42
End: 2022-11-22
Payer: COMMERCIAL

## 2022-11-22 VITALS — WEIGHT: 239 LBS | BODY MASS INDEX: 37.51 KG/M2 | TEMPERATURE: 99 F | HEIGHT: 67 IN

## 2022-11-22 DIAGNOSIS — H61.21 CERUMEN DEBRIS ON TYMPANIC MEMBRANE OF RIGHT EAR: Primary | ICD-10-CM

## 2022-11-22 PROCEDURE — 3008F BODY MASS INDEX DOCD: CPT | Performed by: SPECIALIST

## 2022-11-22 PROCEDURE — 69210 REMOVE IMPACTED EAR WAX UNI: CPT | Performed by: SPECIALIST

## 2022-11-22 RX ORDER — METRONIDAZOLE 500 MG/1
TABLET ORAL
COMMUNITY
Start: 2022-11-02

## 2022-11-22 NOTE — PROGRESS NOTES
Ears = right cerumen occlussion   Fully cleaned under microscope using instrumentation and suctioning. Normal tympanic membranes. Avoid using cotton swabs. Follow-up with any additional questions or problems.

## 2022-11-22 NOTE — PATIENT INSTRUCTIONS
Cerumen was fully cleaned from your right ear. Follow-up if there are any additional questions or problems. Avoid using cotton swabs.

## 2022-12-01 ENCOUNTER — TELEPHONE (OUTPATIENT)
Dept: HEMATOLOGY/ONCOLOGY | Facility: HOSPITAL | Age: 42
End: 2022-12-01

## 2022-12-01 NOTE — TELEPHONE ENCOUNTER
Patient calling to schedule consult with Dr Joy Pitts    Referring Provider: Holly Bravo Referred To Provider: Yfn Wick   Diagnosis: I82.90 (ICD-10-CM) - 453.9 (ICD-9-CM) - Thrombosis   11/14/2022 - 11/15/2022 (1 hours)  [facility] AND [facility] Emergency Department  DVT, lower extremity, distal, acute, left

## 2022-12-09 ENCOUNTER — TELEPHONE (OUTPATIENT)
Dept: INTERNAL MEDICINE CLINIC | Facility: CLINIC | Age: 42
End: 2022-12-09

## 2022-12-09 RX ORDER — METRONIDAZOLE 500 MG/1
TABLET ORAL
Refills: 0 | OUTPATIENT
Start: 2022-12-09

## 2022-12-09 NOTE — TELEPHONE ENCOUNTER
Spoke to patient who was requested Metronidazole. She explained that she was recently on the medication but is still having symptoms so was asking for a refill. Patient said that a [facility] prescriber was the one who recently ordered medication. She will contact them to see if she can get a refill.

## 2022-12-16 ENCOUNTER — OFFICE VISIT (OUTPATIENT)
Dept: HEMATOLOGY/ONCOLOGY | Facility: HOSPITAL | Age: 42
End: 2022-12-16
Attending: INTERNAL MEDICINE
Payer: COMMERCIAL

## 2022-12-16 VITALS
BODY MASS INDEX: 37.83 KG/M2 | DIASTOLIC BLOOD PRESSURE: 86 MMHG | SYSTOLIC BLOOD PRESSURE: 127 MMHG | WEIGHT: 241 LBS | TEMPERATURE: 98 F | HEIGHT: 67 IN | OXYGEN SATURATION: 100 % | HEART RATE: 60 BPM | RESPIRATION RATE: 16 BRPM

## 2022-12-16 DIAGNOSIS — I82.462 ACUTE DEEP VEIN THROMBOSIS (DVT) OF CALF MUSCLE VEIN OF LEFT LOWER EXTREMITY (HCC): Primary | ICD-10-CM

## 2022-12-16 PROCEDURE — 99211 OFF/OP EST MAY X REQ PHY/QHP: CPT

## 2022-12-29 ENCOUNTER — HOSPITAL ENCOUNTER (OUTPATIENT)
Dept: ULTRASOUND IMAGING | Age: 42
Discharge: HOME | End: 2022-12-29
Attending: INTERNAL MEDICINE
Payer: COMMERCIAL

## 2022-12-29 DIAGNOSIS — I82.462 ACUTE DEEP VEIN THROMBOSIS (DVT) OF CALF MUSCLE VEIN OF LEFT LOWER EXTREMITY (HCC): ICD-10-CM

## 2022-12-29 PROCEDURE — 93971 EXTREMITY STUDY: CPT | Performed by: INTERNAL MEDICINE

## 2022-12-30 ENCOUNTER — LAB ENCOUNTER (OUTPATIENT)
Dept: LAB | Facility: HOSPITAL | Age: 42
End: 2022-12-30
Attending: INTERNAL MEDICINE
Payer: COMMERCIAL

## 2022-12-30 DIAGNOSIS — I82.462 ACUTE DEEP VEIN THROMBOSIS (DVT) OF CALF MUSCLE VEIN OF LEFT LOWER EXTREMITY (HCC): ICD-10-CM

## 2022-12-30 LAB
APTT PPP: 24.8 SECONDS (ref 23.3–35.6)
CONFIRM APTT STACLOT: NEGATIVE
CONFIRM DRVVT: 0.9 S (ref 0–1.1)
PROTHROMBIN TIME: 12.5 SECONDS (ref 11.6–14.8)

## 2022-12-30 PROCEDURE — 85613 RUSSELL VIPER VENOM DILUTED: CPT

## 2022-12-30 PROCEDURE — 85730 THROMBOPLASTIN TIME PARTIAL: CPT

## 2022-12-30 PROCEDURE — 86147 CARDIOLIPIN ANTIBODY EA IG: CPT

## 2022-12-30 PROCEDURE — 85610 PROTHROMBIN TIME: CPT

## 2022-12-30 PROCEDURE — 85303 CLOT INHIBIT PROT C ACTIVITY: CPT

## 2022-12-30 PROCEDURE — 85598 HEXAGNAL PHOSPH PLTLT NEUTRL: CPT

## 2022-12-30 PROCEDURE — 81240 F2 GENE: CPT

## 2022-12-30 PROCEDURE — 81241 F5 GENE: CPT

## 2022-12-30 PROCEDURE — 85390 FIBRINOLYSINS SCREEN I&R: CPT

## 2022-12-30 PROCEDURE — 86146 BETA-2 GLYCOPROTEIN ANTIBODY: CPT

## 2022-12-30 PROCEDURE — 85306 CLOT INHIBIT PROT S FREE: CPT

## 2022-12-30 PROCEDURE — 85300 ANTITHROMBIN III ACTIVITY: CPT

## 2022-12-30 PROCEDURE — 36415 COLL VENOUS BLD VENIPUNCTURE: CPT

## 2023-01-01 LAB — ANTITHROMBIN, ENZYMATIC (ACTIVITY): 116 %

## 2023-01-02 LAB
PROTEIN C FUNCTIONAL: 154 %
PROTEIN S FUNCTIONAL: 148 %

## 2023-01-03 LAB
B2 GLYCOPROT1 IGG SERPL IA-ACNC: <0.8 U/ML (ref ?–7)
B2 GLYCOPROT1 IGM SERPL IA-ACNC: <2.4 U/ML (ref ?–7)
CARDIOLIPIN IGG SERPL-ACNC: 0.9 GPL (ref ?–10)
CARDIOLIPIN IGM SERPL-ACNC: 3.2 MPL (ref ?–10)
F2 C.20210G>A GENO BLD/T: NORMAL
F5 P.R506Q BLD/T QL: NORMAL

## 2023-06-21 ENCOUNTER — TELEPHONE (OUTPATIENT)
Dept: INTERNAL MEDICINE CLINIC | Facility: CLINIC | Age: 43
End: 2023-06-21

## 2024-04-08 ENCOUNTER — OFFICE VISIT (OUTPATIENT)
Dept: OTOLARYNGOLOGY | Facility: CLINIC | Age: 44
End: 2024-04-08

## 2024-04-08 DIAGNOSIS — J30.9 ALLERGIC RHINITIS, UNSPECIFIED SEASONALITY, UNSPECIFIED TRIGGER: Primary | ICD-10-CM

## 2024-04-08 PROCEDURE — 99213 OFFICE O/P EST LOW 20 MIN: CPT | Performed by: OTOLARYNGOLOGY

## 2024-04-08 NOTE — PROGRESS NOTES
Isacc Hope is a 43 year old female.    Chief Complaint   Patient presents with    Allergies     Rx refill   Mucus with a cold        HISTORY OF PRESENT ILLNESS  She presents with a history of feeling something stuck in the back of her throat for the past 2 months.  Started on the right side but now feels like it is in the middle of her throat.  She did have a CT scan performed by an immediate care doctor who found no other abnormalities and the CT scan was normal.  Seen by her primary care physician who ordered an ultrasound and found nothing abnormal both suspect that she might have a thyroid issue.  I reviewed both studies and they show no abnormalities.  I did review these with the patient as well and she understands them to both be within normal limits.  She does admit to some postnasal discharge symptoms seem to worsen with change in weather and climate.  She does admit to reflux and states that she has been having issues recently.  This is affected by the type of foods that she ate.  She is changing her biggest meal from dinner to lunch recently.  No other signs, symptoms or complaints.     10/14/21 I last saw her right before the beginning of the pandemic and offered her Claritin-D Singulair and Astelin nasal spray for what appeared to be postnasal discharge issues with throat pain and discomfort.  Only use the spray and she feels that this works very well but overall still has had problems with coming and going sensations of not being able to breathe tightness in her throat and a feeling of something being caught in the back of her throat.  No cough no throat clearing.  She does have chronic postnasal discharge.  No other signs, symptoms or complaints     4/8/24 she presents with a history of blood clots was on apixaban for a while.  She has had 2 blood clots of the lower extremities and had a workup and currently is off of her blood thinners.  She has been exercising more and trying to lose weight.   Overall trying to be very healthy.  Did catch some kind of flulike infection from her granddaughter who was sick as well.  She has been using Astelin montelukast daily but sometimes is able to get to the point where she uses that as needed with relatively good control of her congestive issues.  Antihistamines do not really seem to work.  No other complaints or concerns and seems to be resolving her congestive issues from this recent flu from 10 days ago very well.      Social History     Socioeconomic History    Marital status: Single   Tobacco Use    Smoking status: Some Days     Types: Cigarettes    Smokeless tobacco: Never   Vaping Use    Vaping Use: Never used   Substance and Sexual Activity    Alcohol use: Yes     Comment: occ    Drug use: Yes     Types: Cannabis     Comment: occ    Sexual activity: Not Currently     Partners: Male       Family History   Problem Relation Age of Onset    Heart Disease Mother     Heart Disease Other     Cancer Other     Diabetes Other     Hypertension Other     Ovarian Cancer Maternal Cousin Female 50    Pancreatic Cancer Maternal Cousin Female 50       Past Medical History:   Diagnosis Date    Abnormal weight gain     Acute pharyngitis     Acute venous embolism and thrombosis of unspecified deep vessels of lower extremity     Bunion     Elevated blood pressure reading without diagnosis of hypertension     History of blood clots     Other pulmonary embolism and infarction 2012    After hysterectomy. Rx'd with blood thinner for 6 months    Sprain and strain of unspecified site of knee and leg     Unspecified vitamin D deficiency        Past Surgical History:   Procedure Laterality Date    CHOLECYSTECTOMY      at age 21    HYSTERECTOMY  in 2012     abdominal hysterectomy  pt still has fallobian tubes and ovaries     NEEDLE BIOPSY LEFT  10/29/2020    US bx, benign         REVIEW OF SYSTEMS    System Neg/Pos Details   Constitutional Negative Fatigue, fever and weight loss.   ENMT  Negative Drooling.   Eyes Negative Blurred vision and vision changes.   Respiratory Negative Dyspnea and wheezing.   Cardio Negative Chest pain, irregular heartbeat/palpitations and syncope.   GI Negative Abdominal pain and diarrhea.   Endocrine Negative Cold intolerance and heat intolerance.   Neuro Negative Tremors.   Psych Negative Anxiety and depression.   Integumentary Negative Frequent skin infections, pigment change and rash.   Hema/Lymph Negative Easy bleeding and easy bruising.           PHYSICAL EXAM    There were no vitals taken for this visit.       Constitutional Normal Overall appearance - Normal.   Psychiatric Normal Orientation - Oriented to time, place, person & situation. Appropriate mood and affect.   Neck Exam Normal Inspection - Normal. Palpation - Normal. Parotid gland - Normal. Thyroid gland - Normal.   Eyes Normal Conjunctiva - Right: Normal, Left: Normal. Pupil - Right: Normal, Left: Normal. Fundus - Right: Normal, Left: Normal.   Neurological Normal Memory - Normal. Cranial nerves - Cranial nerves II through XII grossly intact.   Head/Face Normal Facial features - Normal. Eyebrows - Normal. Skull - Normal.        Nasopharynx Normal External nose - Normal. Lips/teeth/gums - Normal. Tonsils - Normal. Oropharynx - Normal.   Ears Normal Inspection - Right: Normal, Left: Normal. Canal - Right: Normal, Left: Normal. TM - Right: Normal, Left: Normal.   Skin Normal Inspection - Normal.        Lymph Detail Normal Submental. Submandibular. Anterior cervical. Posterior cervical. Supraclavicular.        Nose/Mouth/Throat Normal External nose - Normal. Lips/teeth/gums - Normal. Tonsils - Normal. Oropharynx - Normal.   Nose/Mouth/Throat Normal Nares - Right: Normal Left: Normal. Septum -slight deviation to the right turbinates - Right: Normal, Left: Normal.       Current Outpatient Medications:     Ergocalciferol (VITAMIN D OR), Take by mouth once a week., Disp: , Rfl:     metRONIDAZOLE 500 MG Oral Tab,  , Disp: , Rfl:     apixaban 5 MG Oral Tab, Take 2 tabs (10mg) by mouth twice daily for 7 days, then take 1 tab (5mg) by mouth twice daily thereafter., Disp: 74 tablet, Rfl: 0    Azelastine HCl 0.1 % Nasal Solution, 2 sprays by Nasal route 2 (two) times daily., Disp: 30 mL, Rfl: 0  ASSESSMENT AND PLAN    1. Allergic rhinitis, unspecified seasonality, unspecified trigger    She does very well just using Astelin and montelukast.  I will refill these for a year she return to see me as needed for any acute problems or on a yearly basis for refills on her meds.  She does understand to try to use these on appearing basis is much as possible.        This note was prepared using Dragon Medical voice recognition dictation software. As a result errors may occur. When identified these errors have been corrected. While every attempt is made to correct errors during dictation discrepancies may still exist    Rowdy Dawkins MD    4/8/2024    6:32 PM

## 2024-05-28 ENCOUNTER — TELEPHONE (OUTPATIENT)
Dept: OTOLARYNGOLOGY | Facility: CLINIC | Age: 44
End: 2024-05-28

## 2024-05-28 RX ORDER — AZELASTINE 1 MG/ML
2 SPRAY, METERED NASAL 2 TIMES DAILY
Qty: 30 ML | Refills: 3 | Status: SHIPPED | OUTPATIENT
Start: 2024-05-28

## 2024-05-28 RX ORDER — MONTELUKAST SODIUM 10 MG/1
10 TABLET ORAL NIGHTLY
Qty: 30 TABLET | Refills: 3 | Status: SHIPPED | OUTPATIENT
Start: 2024-05-28

## 2024-05-28 NOTE — TELEPHONE ENCOUNTER
Patient called to say that she saw Dr. Dawkins on 4/8/24 and no medications were sent to her pharmacy and I don't see any medications as well.  Per Dr. Dawkins's note on 4/8/24: \"She does very well just using Astelin and montelukast. I will refill these for a year she return to see me as needed for any acute problems or on a yearly basis for refills on her meds. She does understand to try to use these on appearing basis is much as possible.\"    Sent in refills for the Azelastine and Montelukast.

## 2024-12-26 ENCOUNTER — NURSE TRIAGE (OUTPATIENT)
Dept: INTERNAL MEDICINE CLINIC | Facility: CLINIC | Age: 44
End: 2024-12-26

## 2024-12-26 NOTE — TELEPHONE ENCOUNTER
Action Requested: Summary for Provider     []  Critical Lab, Recommendations Needed  [] Need Additional Advice  []   FYI    []   Need Orders  [] Need Medications Sent to Pharmacy  []  Other     SUMMARY: Office visit  Future Appointments   Date Time Provider Department Center   12/30/2024  5:30 PM Marcos Briceno MD [facility] [place]     Reason for call: Numbness  Onset: Data Unavailable      Patient has stiffness in the neck and tingling down the left side of the arm. She can touch her neck to her chest. She has had this before. She waited too long to get it checked last time and the numbness traveled to her finger tips. She denies pain. She states it feels like she slept on it wrong.     Reason for Disposition   Neurologic deficit of gradual onset (e.g., days to weeks), ANY of the following: * Weakness of the face, arm, or leg on one side of the body* Numbness of the face, arm, or leg on one side of the body* Loss of speech or garbled speech    Protocols used: Neurologic Deficit-A-OH

## 2024-12-30 ENCOUNTER — OFFICE VISIT (OUTPATIENT)
Dept: INTERNAL MEDICINE CLINIC | Facility: CLINIC | Age: 44
End: 2024-12-30

## 2024-12-30 VITALS
RESPIRATION RATE: 18 BRPM | SYSTOLIC BLOOD PRESSURE: 141 MMHG | BODY MASS INDEX: 38.45 KG/M2 | OXYGEN SATURATION: 98 % | HEART RATE: 89 BPM | DIASTOLIC BLOOD PRESSURE: 89 MMHG | WEIGHT: 245 LBS | TEMPERATURE: 98 F | HEIGHT: 67 IN

## 2024-12-30 DIAGNOSIS — M54.2 NECK PAIN ON LEFT SIDE: Primary | ICD-10-CM

## 2024-12-30 DIAGNOSIS — R20.0 ARM NUMBNESS LEFT: ICD-10-CM

## 2024-12-30 PROCEDURE — 99214 OFFICE O/P EST MOD 30 MIN: CPT | Performed by: INTERNAL MEDICINE

## 2024-12-30 RX ORDER — CYCLOBENZAPRINE HCL 5 MG
5 TABLET ORAL NIGHTLY PRN
Qty: 30 TABLET | Refills: 0 | Status: SHIPPED | OUTPATIENT
Start: 2024-12-30

## 2024-12-30 RX ORDER — METHYLPREDNISOLONE 4 MG/1
TABLET ORAL
Qty: 1 EACH | Refills: 0 | Status: SHIPPED | OUTPATIENT
Start: 2024-12-30

## 2024-12-30 RX ORDER — VALACYCLOVIR HYDROCHLORIDE 500 MG/1
500 TABLET, FILM COATED ORAL DAILY
COMMUNITY
Start: 2024-01-25 | End: 2025-04-19

## 2024-12-31 NOTE — PROGRESS NOTES
Subjective:   Patient ID: Isacc Hope is a 44 year old female.    HPI  Patient comes in today with complaint of left sided neck pain with radiation into the shoulder down the left arm with numbness has been going on for about 3 weeks now no weakness no incontinence history in the area she has had similar symptoms but on the right side before.  Prior cervical spine x-ray showed arthritis    History/Other:   Review of Systems   Constitutional: Negative.    HENT: Negative.     Eyes: Negative.    Respiratory: Negative.     Cardiovascular: Negative.    Gastrointestinal: Negative.    Genitourinary: Negative.    Musculoskeletal:  Positive for neck pain.        With radiation to the left neck and arm   Skin: Negative.    Neurological: Negative.    Psychiatric/Behavioral: Negative.       Current Outpatient Medications   Medication Sig Dispense Refill    valACYclovir 500 MG Oral Tab Take 1 tablet (500 mg total) by mouth daily.      methylPREDNISolone (MEDROL) 4 MG Oral Tablet Therapy Pack As directed. 1 each 0    cyclobenzaprine 5 MG Oral Tab Take 1 tablet (5 mg total) by mouth nightly as needed. 30 tablet 0    montelukast 10 MG Oral Tab Take 1 tablet (10 mg total) by mouth nightly. 30 tablet 3    azelastine 0.1 % Nasal Solution 2 sprays by Nasal route 2 (two) times daily. (Patient not taking: Reported on 12/30/2024) 30 mL 3    Ergocalciferol (VITAMIN D OR) Take by mouth once a week. (Patient not taking: Reported on 12/30/2024)      metRONIDAZOLE 500 MG Oral Tab  (Patient not taking: Reported on 12/30/2024)      apixaban 5 MG Oral Tab Take 2 tabs (10mg) by mouth twice daily for 7 days, then take 1 tab (5mg) by mouth twice daily thereafter. (Patient not taking: Reported on 12/30/2024) 74 tablet 0     Allergies:Allergies[1]    Objective:   Physical Exam  Vitals and nursing note reviewed.   Constitutional:       Appearance: She is well-developed.   HENT:      Head: Normocephalic and atraumatic.      Right Ear: External ear  normal.      Left Ear: External ear normal.      Nose: Nose normal.   Eyes:      Conjunctiva/sclera: Conjunctivae normal.      Pupils: Pupils are equal, round, and reactive to light.   Cardiovascular:      Rate and Rhythm: Normal rate and regular rhythm.      Heart sounds: Normal heart sounds.   Pulmonary:      Effort: Pulmonary effort is normal.      Breath sounds: Normal breath sounds.   Abdominal:      General: Bowel sounds are normal.      Palpations: Abdomen is soft.   Genitourinary:     Vagina: Normal.   Musculoskeletal:         General: Tenderness present. Normal range of motion.      Cervical back: Normal range of motion and neck supple.      Comments: Left side of neck with radiation to left arm   Skin:     General: Skin is warm and dry.   Neurological:      Mental Status: She is alert and oriented to person, place, and time.      Deep Tendon Reflexes: Reflexes are normal and symmetric.   Psychiatric:         Behavior: Behavior normal.         Thought Content: Thought content normal.         Judgment: Judgment normal.         Assessment & Plan:   1. Neck pain on left side likely herniated or bulging disc will treat with steroids and muscle relaxant will refer to physiatry any worsening symptoms to let us know or go to ER   2. Arm numbness left as above       No orders of the defined types were placed in this encounter.      Meds This Visit:  Requested Prescriptions     Signed Prescriptions Disp Refills    methylPREDNISolone (MEDROL) 4 MG Oral Tablet Therapy Pack 1 each 0     Sig: As directed.    cyclobenzaprine 5 MG Oral Tab 30 tablet 0     Sig: Take 1 tablet (5 mg total) by mouth nightly as needed.       Imaging & Referrals:  PHYSIATRY - INTERNAL         [1] No Known Allergies

## 2025-01-09 RX ORDER — VALACYCLOVIR HYDROCHLORIDE 500 MG/1
500 TABLET, FILM COATED ORAL DAILY
Qty: 30 TABLET | Refills: 14 | OUTPATIENT
Start: 2025-01-09 | End: 2026-04-04

## 2025-01-10 ENCOUNTER — NURSE ONLY (OUTPATIENT)
Facility: CLINIC | Age: 45
End: 2025-01-10

## 2025-01-10 DIAGNOSIS — Z12.11 COLON CANCER SCREENING: Primary | ICD-10-CM

## 2025-01-10 RX ORDER — GABAPENTIN 100 MG/1
100 CAPSULE ORAL 3 TIMES DAILY
COMMUNITY

## 2025-01-10 NOTE — PROGRESS NOTES
Hortensia,     Called patient for scheduled telephone colon screening.  Routed due to: Patient with Hx of DVT not longer on blood thinner. Please advise if patient needs to be seen in office or ok to schedule for Colonoscopy procedure.   Medications, pharmacy, and allergies verified with the patient.   **Please review and advise on colonoscopy and bowel prep orders**.   Thank you!

## 2025-01-10 NOTE — PROGRESS NOTES
Called patient for scheduled telephone colon screening/positive FIT result.   Medications, pharmacy, and allergies verified with the patient.   Please advise on colonoscopy and bowel prep orders.     Age 45-66 y/o (Y/N):   66-74 y/o ? Route to GI provider per rotation schedule   › GI MD preference:    › Insurance:  [employer]  › Last PCP Visit: 12/30/2024 with Dr Briceno  IF NO PCP within [facility] ? GI in-person consultation   › Last CBC drawn: 4/27/2024  › Date of positive FIT test: N/A  › H/W/BMI: 5'7\"/245 lb/38.36    Special comments/notes:  Telephone Colon Screening Questionnaire Yes No   Are you currently experiencing any new/abnormal GI symptoms? [] [x]   If yes, explain:     Rectal bleeding? [] [x]   Black stool? [] [x]   Dysphagia or food \"feeling stuck\" when eating? [] [x]   Intractable vomiting? [] [x]   Unexplained weight loss? [] [x]   First colonoscopy? [x] []   Family history of colon cancer? [] [x]   Any issues with anesthesia? [] [x]   If yes, explain:      Have you had a stroke, heart attack or stent placement in the last 12 months?  [] [x]   If yes ? GI in-person consultation      Personal history of resp. Issues/oxygen use/CRYS/COPD [] [x]   If yes, CPAP/BiPAP?     History of devices (pacemaker/defibrillator) [] [x]   History of cardiac/CVA issues/(MI/stroke) (see above) [] [x]     Medications Yes  No   Anticoagulants  Anticoagulant (Except Aspirin) ? route to RN pool to request adjustments from prescribing provider  [] [x]   Diabetic Meds  PO ? hold DAY PRIOR and DAY OF procedure  Insulin ? route to RN pool to request adjustments from prescribing provider  [] [x]   Weight loss meds (Phentermine/Vyvanse/Saxsenda/etc): [] [x]   Iron/herbal/multivitamin supplement (RX/OTC): [x] []   Usage of marijuana, CBD &/or vape products: [x] []

## 2025-01-14 NOTE — PROGRESS NOTES
[facility]        Please send prep       [id number] - [place], [place] - [address] AT [address] [phone], [phone] [address] [facility] [address]   Phone: [phone] Fax: [phone]

## 2025-01-14 NOTE — PROGRESS NOTES
GI Schedulers-  Procedure: colonoscopy  Provider: [facility] endoscopist  Dx: Colorectal cancer screening  Sedation: MAC (hx marijuana use)  Prep: Split dose golytely  Med changes: none, per MA note pt no longer on anticoagulant    Thank you.  Hortensia Cobb APRN

## 2025-01-14 NOTE — PROGRESS NOTES
Scheduled for:  Colonoscopy 70[address]  Provider Name:     Date:  Wed, 06/04/2025  Location:  [facility] (hx marijuana use)   Sedation:  MAC   Time:  730AM (pt is aware Endo will call with arrival time     Prep:  Golytely   Meds/Allergies Reconciled?:  Yes    Diagnosis with codes:  Colorectal cancer screening Z12.11  Was patient informed to call insurance with codes (Y/N):  Yes     Referral sent?:  Referral was sent at the time of electronic surgical scheduling.    EM or [facility] notified?:  I sent an electronic request to Endo Scheduling and received a confirmation today.       Medication Orders:  none, per MA note pt no longer on anticoagulant   Misc Orders:  none     Further instructions given by staff:  I discussed the prep instructions with the patient which she verbally understood and is aware that I will send the instructions [email or web address] Application

## 2025-01-23 ENCOUNTER — OFFICE VISIT (OUTPATIENT)
Dept: PHYSICAL MEDICINE AND REHAB | Facility: CLINIC | Age: 45
End: 2025-01-23
Payer: COMMERCIAL

## 2025-01-23 ENCOUNTER — HOSPITAL ENCOUNTER (OUTPATIENT)
Dept: GENERAL RADIOLOGY | Age: 45
Discharge: HOME | End: 2025-01-23
Attending: PHYSICAL MEDICINE & REHABILITATION
Payer: COMMERCIAL

## 2025-01-23 VITALS — BODY MASS INDEX: 38.45 KG/M2 | HEIGHT: 67 IN | WEIGHT: 245 LBS

## 2025-01-23 DIAGNOSIS — M54.12 CERVICAL RADICULOPATHY: ICD-10-CM

## 2025-01-23 DIAGNOSIS — M54.12 CERVICAL RADICULOPATHY: Primary | ICD-10-CM

## 2025-01-23 PROCEDURE — 72052 X-RAY EXAM NECK SPINE 6/>VWS: CPT | Performed by: PHYSICAL MEDICINE & REHABILITATION

## 2025-01-23 PROCEDURE — 99204 OFFICE O/P NEW MOD 45 MIN: CPT | Performed by: PHYSICAL MEDICINE & REHABILITATION

## 2025-01-23 NOTE — PROGRESS NOTES
NEW PATIENT VISIT    CHIEF COMPLAINT  Neck Pain      HISTORY OF PRESENTING ILLNESS  Isacc Hope is a 44 year old female who presents for evaluation of neck pain.  Patient is referred to my office through her primary care physician with complaints of primarily left-sided neck pain and discomfort with some tingling down into the left upper extremity.  Denies overt weakness.  Currently rates level of pain 1/10 but this worsens with activity and motion.  Denies recent imaging.    No recent physical therapy.  Onset of symptoms was around 6 weeks ago, unclear if there was any inciting event or injury.  No interventions previously.  Not currently using medication at this time.    She was prescribed a muscle relaxer and a steroid. She did not take these medications.     She did take gabapentin for a short period, but didn't feel as though this was helping.     Possible exacerbating situations: dad home from hopsital requiring repositioning help, lat pulls at the gym, resting her forearm at work, and sleeping with elbow bend and shoulder extended.     PAST MEDICAL HISTORY  Past Medical History:    Abnormal weight gain    Acute pharyngitis    Acute venous embolism and thrombosis of unspecified deep vessels of lower extremity    Bunion    Elevated blood pressure reading without diagnosis of hypertension    History of blood clots    Other pulmonary embolism and infarction    After hysterectomy. Rx'd with blood thinner for 6 months    Sprain and strain of unspecified site of knee and leg    Unspecified vitamin D deficiency       PAST SURGICAL HISTORY  Past Surgical History:   Procedure Laterality Date    Cholecystectomy      at age 21    Hysterectomy  in 2012     abdominal hysterectomy  pt still has fallobian tubes and ovaries     Needle biopsy left  10/29/2020    US bx, benign       MEDICATIONS  Medications Ordered Prior to Encounter[1]    ALLERGIES  Allergies[2]    SOCIAL HISTORY   reports that she has been smoking  cigarettes. She has never been exposed to tobacco smoke. She has never used smokeless tobacco. She reports current alcohol use. She reports current drug use. Drug: Cannabis.    FAMILY HISTORY  Family History   Problem Relation Age of Onset    Heart Disease Mother     Heart Disease Other     Cancer Other     Diabetes Other     Hypertension Other     Ovarian Cancer Maternal Cousin Female 50    Pancreatic Cancer Maternal Cousin Female 50       REVIEW OF SYSTEMS  Complete review of systems was performed and was negative except for those items stated in the History of Presenting Illness and Past Medical/Surgical History.    PHYSICAL EXAMINATION  GENERAL:  In no acute distress. Well-developed and well nourished.   SKIN: No rashes or open wounds involving the upper extremities and neck.  NEUROLOGIC:   Strength: 5/5 throughout bilateral upper and lower extremities in all major muscle groups.   Sensation: intact light touch sensation throughout bilateral upper and lower extremities.   Reflexes: intact and symmetric in bilateral upper and lower extremities. José’s negative. Babinski downgoing bilaterally. No clonus.   Gait: able to heel walk, toe walk, and perform tandem gait.   MUSCULOSKELETAL:  Inspection: shoulders in protracted positions, lower cervical flexion, upper cervical extension posture. No scapular winging or muscular atrophy.  Palpation: tenderness was present over left cervical paraspinal musculature with extension through the left levator scapula and periscapular musculature.  Pain with forward flexion and extension of the cervical spine.  Positive Spurling's maneuver with reproduction of pain traveling down to the left upper extremity reaching the mid forearm.      REVIEW OF PRIOR X-RAYS/STUDIES  Independently reviewed the plain film radiographs of the cervical spine dated 7/1/2022 which reveals some spondylosis change without abnormal alignment or subluxation from C4-C7.    IMPRESSION/DIAGNOSIS  Encounter  Diagnosis   Name Primary?    Cervical radiculopathy Yes       TREATMENT/PLAN  Decently suspicious for compressive pathology in the cervical spine leading to left upper extremity radicular complaints.    XR cervical spine  PT to start  Agree with Medrol  Hold neuropathics, consider addition/titration at a later date.   Consider MRI at a later date depending on response to the above.     Education was provided regarding the above impression/diagnosis and treatment options/plan were discussed.  All questions were answered during today's visit.  Patient will contact clinic if any other questions or concerns.            Jaguar Herrera,   Interventional Spine and Sports Medicine Specialist   Physical Medicine and Rehabilitation  [address] [address]    [facility]  [address] [place] [address]               [1]   Current Outpatient Medications on File Prior to Visit   Medication Sig Dispense Refill    polyethylene glycol, PEG 3350-KCl-NaBcb-NaCl-NaSulf, 236 g Oral Recon Soln Take 4,000 mL by mouth As Directed. Take 2,000 mL the night before your procedure and 2,000 mL the morning of your procedure. Take as directed by GI clinic. Okay to substitute for generic. 1 each 0    gabapentin 100 MG Oral Cap Take 1 capsule (100 mg total) by mouth 3 (three) times daily.      valACYclovir 500 MG Oral Tab Take 1 tablet (500 mg total) by mouth daily. (Patient not taking: Reported on 1/10/2025)      methylPREDNISolone (MEDROL) 4 MG Oral Tablet Therapy Pack As directed. (Patient not taking: Reported on 1/10/2025) 1 each 0    cyclobenzaprine 5 MG Oral Tab Take 1 tablet (5 mg total) by mouth nightly as needed. 30 tablet 0    azelastine 0.1 % Nasal Solution 2 sprays by Nasal route 2 (two) times daily. 30 mL 3    montelukast 10 MG Oral Tab Take 1 tablet (10 mg total) by mouth nightly. 30 tablet 3    Ergocalciferol (VITAMIN D OR) Take by mouth once a  week.      metRONIDAZOLE 500 MG Oral Tab  (Patient not taking: Reported on 1/10/2025)      apixaban 5 MG Oral Tab Take 2 tabs (10mg) by mouth twice daily for 7 days, then take 1 tab (5mg) by mouth twice daily thereafter. (Patient not taking: Reported on 1/10/2025) 74 tablet 0     No current facility-administered medications on file prior to visit.   [2] No Known Allergies

## 2025-02-05 ENCOUNTER — MED REC SCAN ONLY (OUTPATIENT)
Dept: PHYSICAL MEDICINE AND REHAB | Facility: CLINIC | Age: 45
End: 2025-02-05

## 2025-02-17 ENCOUNTER — OFFICE VISIT (OUTPATIENT)
Dept: INTERNAL MEDICINE CLINIC | Facility: CLINIC | Age: 45
End: 2025-02-17
Payer: COMMERCIAL

## 2025-02-17 VITALS
OXYGEN SATURATION: 98 % | SYSTOLIC BLOOD PRESSURE: 136 MMHG | TEMPERATURE: 98 F | HEART RATE: 63 BPM | DIASTOLIC BLOOD PRESSURE: 86 MMHG | WEIGHT: 249 LBS | RESPIRATION RATE: 17 BRPM | BODY MASS INDEX: 39.08 KG/M2 | HEIGHT: 67 IN

## 2025-02-17 DIAGNOSIS — Z71.6 ENCOUNTER FOR SMOKING CESSATION COUNSELING: ICD-10-CM

## 2025-02-17 DIAGNOSIS — Z86.718 HISTORY OF DVT IN ADULTHOOD: ICD-10-CM

## 2025-02-17 DIAGNOSIS — Z28.21 FLU VACCINE REFUSED: ICD-10-CM

## 2025-02-17 DIAGNOSIS — M54.2 NECK PAIN ON LEFT SIDE: ICD-10-CM

## 2025-02-17 DIAGNOSIS — Z00.00 ANNUAL PHYSICAL EXAM: Primary | ICD-10-CM

## 2025-02-17 PROCEDURE — 90715 TDAP VACCINE 7 YRS/> IM: CPT | Performed by: INTERNAL MEDICINE

## 2025-02-17 PROCEDURE — 90471 IMMUNIZATION ADMIN: CPT | Performed by: INTERNAL MEDICINE

## 2025-02-17 PROCEDURE — 99396 PREV VISIT EST AGE 40-64: CPT | Performed by: INTERNAL MEDICINE

## 2025-02-17 RX ORDER — VALACYCLOVIR HYDROCHLORIDE 500 MG/1
500 TABLET, FILM COATED ORAL DAILY
Qty: 30 TABLET | Refills: 14 | Status: SHIPPED | OUTPATIENT
Start: 2025-02-17 | End: 2026-05-13

## 2025-02-17 NOTE — PROGRESS NOTES
Subjective:     Patient ID: Isacc Hope is a 44 year old female.    Pt comes in for annual exam. He neck yamil is better with pt follows with physiatry   Denies any cp no sob   Pt is schedulled for colonoscopy   Had mammogram last month           History/Other:   Review of Systems   Constitutional: Negative.    HENT: Negative.     Eyes: Negative.    Respiratory: Negative.     Cardiovascular: Negative.    Gastrointestinal: Negative.    Genitourinary: Negative.    Musculoskeletal: Negative.    Skin: Negative.    Neurological: Negative.    Psychiatric/Behavioral: Negative.       Current Outpatient Medications   Medication Sig Dispense Refill    valACYclovir 500 MG Oral Tab Take 1 tablet (500 mg total) by mouth daily. 30 tablet 14    polyethylene glycol, PEG 3350-KCl-NaBcb-NaCl-NaSulf, 236 g Oral Recon Soln Take 4,000 mL by mouth As Directed. Take 2,000 mL the night before your procedure and 2,000 mL the morning of your procedure. Take as directed by GI clinic. Okay to substitute for generic. 1 each 0    gabapentin 100 MG Oral Cap Take 1 capsule (100 mg total) by mouth 3 (three) times daily.      azelastine 0.1 % Nasal Solution 2 sprays by Nasal route 2 (two) times daily. 30 mL 3    montelukast 10 MG Oral Tab Take 1 tablet (10 mg total) by mouth nightly. 30 tablet 3    Ergocalciferol (VITAMIN D OR) Take by mouth once a week.       Allergies:Allergies[1]    Past Medical History:    Abnormal weight gain    Acute pharyngitis    Acute venous embolism and thrombosis of unspecified deep vessels of lower extremity    Bunion    Elevated blood pressure reading without diagnosis of hypertension    History of blood clots    Other pulmonary embolism and infarction    After hysterectomy. Rx'd with blood thinner for 6 months    Sprain and strain of unspecified site of knee and leg    Unspecified vitamin D deficiency      Past Surgical History:   Procedure Laterality Date    Cholecystectomy      at age 21    Hysterectomy  in 2012      abdominal hysterectomy  pt still has fallobian tubes and ovaries     Needle biopsy left  10/29/2020    US bx, benign      Family History   Problem Relation Age of Onset    Heart Disease Mother     Heart Disease Other     Cancer Other     Diabetes Other     Hypertension Other     Ovarian Cancer Maternal Cousin Female 50    Pancreatic Cancer Maternal Cousin Female 50      Social History:   Social History     Socioeconomic History    Marital status: Single   Tobacco Use    Smoking status: Some Days     Types: Cigarettes     Passive exposure: Never    Smokeless tobacco: Never   Vaping Use    Vaping status: Never Used   Substance and Sexual Activity    Alcohol use: Yes     Comment: occ    Drug use: Yes     Types: Cannabis     Comment: occ    Sexual activity: Not Currently     Partners: Male        Objective:   Physical Exam  Vitals and nursing note reviewed.   Constitutional:       Appearance: She is well-developed.   HENT:      Head: Normocephalic and atraumatic.      Right Ear: External ear normal.      Left Ear: External ear normal.      Nose: Nose normal.   Eyes:      Conjunctiva/sclera: Conjunctivae normal.      Pupils: Pupils are equal, round, and reactive to light.   Cardiovascular:      Rate and Rhythm: Normal rate and regular rhythm.      Heart sounds: Normal heart sounds.   Pulmonary:      Effort: Pulmonary effort is normal.      Breath sounds: Normal breath sounds.   Abdominal:      General: Bowel sounds are normal.      Palpations: Abdomen is soft.   Genitourinary:     Vagina: Normal.   Musculoskeletal:         General: Normal range of motion.      Cervical back: Normal range of motion and neck supple.   Skin:     General: Skin is warm and dry.   Neurological:      Mental Status: She is alert and oriented to person, place, and time.      Deep Tendon Reflexes: Reflexes are normal and symmetric.   Psychiatric:         Behavior: Behavior normal.         Thought Content: Thought content normal.          Judgment: Judgment normal.         Assessment & Plan:   1. Annual physical exam exam is ok will order labs , will order EKG as part of annual exam    2. Neck pain on left side - improved continue with pt    3. History of DVT in adulthood - pt has had 2 dvts, 1-st P post surgery, then 2-nd one ?? Cause seen hematology had work up was negative was told to take the blood thinner for 6 months and stop but if she gets another DVT then she is can have to take it for life.   4. Flu vaccine refused    5. Encounter for smoking cessation counseling patient says she smokes here and there and once in a while socially with friends or with drinks never 1 pack a day smoker       Orders Placed This Encounter   Procedures    CBC With Differential With Platelet    Comp Metabolic Panel (14)    Lipid Panel    TSH W Reflex To Free T4    Urinalysis, Routine    TETANUS, DIPHTHERIA TOXOIDS AND ACELLULAR PERTUSIS VACCINE (TDAP), >7 YEARS, IM USE       Meds This Visit:  Requested Prescriptions     Signed Prescriptions Disp Refills    valACYclovir 500 MG Oral Tab 30 tablet 14     Sig: Take 1 tablet (500 mg total) by mouth daily.       Imaging & Referrals:  TETANUS, DIPHTHERIA TOXOIDS AND ACELLULAR PERTUSIS VACCINE (TDAP), >7 YEARS, IM USE  EKG 12-LEAD            [1] No Known Allergies

## 2025-02-19 ENCOUNTER — APPOINTMENT (OUTPATIENT)
Dept: CT IMAGING | Facility: HOSPITAL | Age: 45
End: 2025-02-19
Attending: EMERGENCY MEDICINE
Payer: COMMERCIAL

## 2025-02-19 ENCOUNTER — HOSPITAL ENCOUNTER (EMERGENCY)
Facility: HOSPITAL | Age: 45
Discharge: HOME | End: 2025-02-20
Attending: EMERGENCY MEDICINE
Payer: COMMERCIAL

## 2025-02-19 DIAGNOSIS — R51.9 NONINTRACTABLE HEADACHE, UNSPECIFIED CHRONICITY PATTERN, UNSPECIFIED HEADACHE TYPE: Primary | ICD-10-CM

## 2025-02-19 LAB
ANION GAP SERPL CALC-SCNC: 8 MMOL/L (ref 0–18)
B-HCG UR QL: NEGATIVE
BASOPHILS # BLD AUTO: 0.06 X10(3) UL (ref 0–0.2)
BASOPHILS NFR BLD AUTO: 0.9 %
BUN BLD-MCNC: 13 MG/DL (ref 9–23)
BUN/CREAT SERPL: 13.1 (ref 10–20)
CALCIUM BLD-MCNC: 9.3 MG/DL (ref 8.7–10.4)
CHLORIDE SERPL-SCNC: 105 MMOL/L (ref 98–112)
CO2 SERPL-SCNC: 26 MMOL/L (ref 21–32)
CREAT BLD-MCNC: 0.99 MG/DL
DEPRECATED RDW RBC AUTO: 37.7 FL (ref 35.1–46.3)
EGFRCR SERPLBLD CKD-EPI 2021: 72 ML/MIN/1.73M2 (ref 60–?)
EOSINOPHIL # BLD AUTO: 0.17 X10(3) UL (ref 0–0.7)
EOSINOPHIL NFR BLD AUTO: 2.5 %
ERYTHROCYTE [DISTWIDTH] IN BLOOD BY AUTOMATED COUNT: 13.2 % (ref 11–15)
GLUCOSE BLD-MCNC: 96 MG/DL (ref 70–99)
HCT VFR BLD AUTO: 36.2 %
HGB BLD-MCNC: 12.1 G/DL
IMM GRANULOCYTES # BLD AUTO: 0.02 X10(3) UL (ref 0–1)
IMM GRANULOCYTES NFR BLD: 0.3 %
LYMPHOCYTES # BLD AUTO: 3.21 X10(3) UL (ref 1–4)
LYMPHOCYTES NFR BLD AUTO: 47.3 %
MAGNESIUM SERPL-MCNC: 1.9 MG/DL (ref 1.6–2.6)
MCH RBC QN AUTO: 26.7 PG (ref 26–34)
MCHC RBC AUTO-ENTMCNC: 33.4 G/DL (ref 31–37)
MCV RBC AUTO: 79.7 FL
MONOCYTES # BLD AUTO: 0.58 X10(3) UL (ref 0.1–1)
MONOCYTES NFR BLD AUTO: 8.6 %
NEUTROPHILS # BLD AUTO: 2.74 X10 (3) UL (ref 1.5–7.7)
NEUTROPHILS # BLD AUTO: 2.74 X10(3) UL (ref 1.5–7.7)
NEUTROPHILS NFR BLD AUTO: 40.4 %
OSMOLALITY SERPL CALC.SUM OF ELEC: 288 MOSM/KG (ref 275–295)
PLATELET # BLD AUTO: 278 10(3)UL (ref 150–450)
POTASSIUM SERPL-SCNC: 3.6 MMOL/L (ref 3.5–5.1)
RBC # BLD AUTO: 4.54 X10(6)UL
SODIUM SERPL-SCNC: 139 MMOL/L (ref 136–145)
TROPONIN I SERPL HS-MCNC: <3 NG/L
WBC # BLD AUTO: 6.8 X10(3) UL (ref 4–11)

## 2025-02-19 PROCEDURE — 93010 ELECTROCARDIOGRAM REPORT: CPT

## 2025-02-19 PROCEDURE — 83735 ASSAY OF MAGNESIUM: CPT | Performed by: EMERGENCY MEDICINE

## 2025-02-19 PROCEDURE — 93005 ELECTROCARDIOGRAM TRACING: CPT

## 2025-02-19 PROCEDURE — 85025 COMPLETE CBC W/AUTO DIFF WBC: CPT | Performed by: EMERGENCY MEDICINE

## 2025-02-19 PROCEDURE — 36415 COLL VENOUS BLD VENIPUNCTURE: CPT

## 2025-02-19 PROCEDURE — 84484 ASSAY OF TROPONIN QUANT: CPT | Performed by: EMERGENCY MEDICINE

## 2025-02-19 PROCEDURE — 70498 CT ANGIOGRAPHY NECK: CPT | Performed by: EMERGENCY MEDICINE

## 2025-02-19 PROCEDURE — 99285 EMERGENCY DEPT VISIT HI MDM: CPT

## 2025-02-19 PROCEDURE — 80048 BASIC METABOLIC PNL TOTAL CA: CPT | Performed by: EMERGENCY MEDICINE

## 2025-02-19 PROCEDURE — 70496 CT ANGIOGRAPHY HEAD: CPT | Performed by: EMERGENCY MEDICINE

## 2025-02-19 PROCEDURE — 81025 URINE PREGNANCY TEST: CPT

## 2025-02-19 RX ORDER — TETRACAINE HYDROCHLORIDE 5 MG/ML
1 SOLUTION OPHTHALMIC ONCE
Status: COMPLETED | OUTPATIENT
Start: 2025-02-19 | End: 2025-02-19

## 2025-02-20 VITALS
WEIGHT: 249 LBS | DIASTOLIC BLOOD PRESSURE: 92 MMHG | OXYGEN SATURATION: 98 % | SYSTOLIC BLOOD PRESSURE: 136 MMHG | BODY MASS INDEX: 39 KG/M2 | TEMPERATURE: 98 F | RESPIRATION RATE: 13 BRPM | HEART RATE: 46 BPM

## 2025-02-20 LAB
ATRIAL RATE: 67 BPM
P AXIS: 61 DEGREES
P-R INTERVAL: 206 MS
Q-T INTERVAL: 422 MS
QRS DURATION: 96 MS
QTC CALCULATION (BEZET): 445 MS
R AXIS: 19 DEGREES
T AXIS: 11 DEGREES
VENTRICULAR RATE: 67 BPM

## 2025-02-20 NOTE — ED QUICK NOTES
Rounding Completed    Plan of Care reviewed. Waiting for CT results.  Elimination needs assessed.  Patient resting in bed, speaking in full sentences. Pt on cardiac monitor for frequent VS assessments, SB/NSR. No new requests at this time.     Bed is locked and in lowest position. Call light within reach.

## 2025-02-20 NOTE — ED INITIAL ASSESSMENT (HPI)
Patient arrived to ED with main c.o. headache that started yesterday - patient states her BP was elevated - confirms feeling anxious - denies fevers, chills, N/V/D. Patient confirms she has a headache that stems behind her left eyeand is localized. Patient denies dizziness. Patient breathing nonlabored on RA + pt hemodynamically stable. Face symmetrical + patient strength equal bilaterally.

## 2025-02-20 NOTE — DISCHARGE INSTRUCTIONS
Return to emergency department for worsening headache, vomiting, changes in mentation, weakness, numbness, losing stool/urine on yourself.  Please follow with your primary care provider in the next few days for reevaluation  Please make a journal of your blood pressures 3 times a day at the same time every day for the next 1 to 2 weeks.  Present this journal to your primary care provider so that he/she can determine what medications to start you on-if any, OR he/she can readjust your current medications.  Return to emergency room for chest pain, shortness of breath, weakness, numbness, changes in mentation, leg swelling, etc    The Emergency Department is not intended to be a substitute for an effort to provide complete medical care. The imaging, if any, have often been interpreted on a preliminary basis pending final reading by the radiologist. If your blood pressure was greater than 140/90, please have this blood pressure rechecked by your primary care provider in the next several days.

## 2025-02-20 NOTE — ED PROVIDER NOTES
Patient Seen in: [facility]         EMERGENCY DEPARTMENT NOTE    Dictated. Voice Transcription software has been utilized for this dictation (the reader should be aware that typographical errors are possible with voice transcription software and to please contact the dictating physician if there are questions.)         History     Chief Complaint   Patient presents with    Headache       There may be discrepancies from triage note.     HPI    History provided by patient.  44-year-old female with history of DVT x 2 in the past status post anticoagulation treatment 3 years ago and was told she did not need to be placed on anticoagulation complaining of headache that started yesterday.  Gradual in onset.  No trauma.  Denies personal nor family history of intracranial aneurysm.  Smiling during interview, laughs, talkative and in no distress.  States that she has no significant pain, currently 4 out of 10 however she was concerned as she saw a primary care provider last Monday who told her she had elevated blood pressures however upon a manual read it was \" slightly high.\"  No history of hypertension.  Patient states that she has been told that she has elevated blood pressure readings in the past.  States that she took her blood pressure yesterday with a home device and her blood pressures were elevated.  Reports a chronic history of left-sided neck pain with left arm tingling.  No chest pain, shortness of breath.  States that she is currently seeing physiatry for the aforementioned.    Patient states that she saw an eye specialist 1 week ago and was told that she requires glasses.  States that her ocular pressures were checked and she wasn't told they were elevated.  No fevers, chills, nausea, vomiting, diarrhea, constipation, cough, cold symptoms, urinary complaints.  No chest pain, shortness of breath  No neck stiffness, incontinence.  No changes in mentation, no changes in vision, no total/new extremity  weakness, no total/new extremity paresthesia, no difficulty speaking.  No alleviating or exacerbating factors.  Denies orthopnea, pnd, change in exercise tolerance limited by chest pain/sob , lower extremity edema/asymmetry.     No worsening symptoms with exertion.  Upon chart review it appears patient had a x-ray of cervical spine completed 1/23/2025 revealing degenerative changes.  Appears patient saw physiatry, Dr. Herrera 1/23/2025 for neck pain the concern by the clinician was cervical radiculopathy.       History reviewed.   Past Medical History:    Abnormal weight gain    Acute pharyngitis    Acute venous embolism and thrombosis of unspecified deep vessels of lower extremity    Bunion    Elevated blood pressure reading without diagnosis of hypertension    History of blood clots    Other pulmonary embolism and infarction    After hysterectomy. Rx'd with blood thinner for 6 months    Sprain and strain of unspecified site of knee and leg    Unspecified vitamin D deficiency       History reviewed.   Past Surgical History:   Procedure Laterality Date    Cholecystectomy      at age 21    Hysterectomy  in 2012     abdominal hysterectomy  pt still has fallobian tubes and ovaries     Needle biopsy left  10/29/2020    US bx, benign         Medications :  Prescriptions Prior to Admission[1]     Family History   Problem Relation Age of Onset    Heart Disease Mother     Heart Disease Other     Cancer Other     Diabetes Other     Hypertension Other     Ovarian Cancer Maternal Cousin Female 50    Pancreatic Cancer Maternal Cousin Female 50       Smoking Status:   Social History     Socioeconomic History    Marital status: Single   Tobacco Use    Smoking status: Some Days     Types: Cigarettes     Passive exposure: Never    Smokeless tobacco: Never   Vaping Use    Vaping status: Never Used   Substance and Sexual Activity    Alcohol use: Yes     Comment: occ    Drug use: Yes     Types: Cannabis     Comment: occ    Sexual  activity: Not Currently     Partners: Male       Review of Systems   Constitutional: Negative.    HENT: Negative.     Eyes: Negative.    Respiratory: Negative.     Cardiovascular: Negative.    Gastrointestinal: Negative.    Genitourinary: Negative.    Musculoskeletal: Negative.    Skin: Negative.    Neurological:  Positive for headaches.   Endo/Heme/Allergies: Negative.    Psychiatric/Behavioral: Negative.     All other systems reviewed and are negative.    Pertinent positives as listed.  All other organ systems are reviewed and are negative.    Constitutional and vital signs reviewed.      Social History and Family History elements reviewed from today, pertinent positives to the presenting problem noted.    Physical Exam     ED Triage Vitals [02/19/25 2041]   /90   Pulse 82   Resp 14   Temp 98.4 °F (36.9 °C)   Temp src Oral   SpO2 98 %   O2 Device None (Room air)       All measures to prevent infection transmission during my interaction with the patient were taken. The patient was already wearing a droplet mask on my arrival to the room. Personal protective equipment including droplet mask, eye protection, and gloves were worn throughout the duration of the exam.  Handwashing was performed prior to and after the exam.  Stethoscope and any equipment used during my examination was cleaned with super sani-cloth germicidal wipes following the exam.     Physical Exam  Vitals and nursing note reviewed.   Constitutional:       General: She is not in acute distress.     Appearance: She is not ill-appearing or toxic-appearing.      Comments: Smiles, talkative, no distress.  Makes jokes.   HENT:      Head: Normocephalic and atraumatic.   Eyes:      Extraocular Movements: Extraocular movements intact.      Conjunctiva/sclera: Conjunctivae normal.      Pupils: Pupils are equal, round, and reactive to light.      Comments: Left ocular pressure of 18 ;right ocular pressure of 15  No corneal abrasion     Neck:       Vascular: No carotid bruit.   Cardiovascular:      Rate and Rhythm: Normal rate and regular rhythm.      Comments: Radial pulses 2+ bilaterally  Dorsalis pedis pulses 2+ bilaterally    Pulmonary:      Effort: Pulmonary effort is normal. No respiratory distress.      Breath sounds: No stridor. No wheezing, rhonchi or rales.   Chest:      Chest wall: No tenderness.   Abdominal:      General: There is no distension.      Palpations: Abdomen is soft.      Tenderness: There is no abdominal tenderness. There is no guarding or rebound.      Comments: Negative Rivas sign, negative McBurney's point tenderness     Musculoskeletal:      Cervical back: Normal range of motion and neck supple. No rigidity.      Right lower leg: No edema.      Left lower leg: No edema.   Skin:     Capillary Refill: Capillary refill takes less than 2 seconds.   Neurological:      Mental Status: She is alert.      Comments: Cranial nerves 3-12 intact.    5/5 bilateral finger , biceps, triceps, leg extension/flexion, dorsiflexion/plantarflexion.  Sensory function intact symmetrically bilaterally to face, upper extremities and lower extremities to soft touch.  Normal finger-to-nose.  Steady gait  Negative pronator drift       Psychiatric:         Mood and Affect: Mood normal.         Behavior: Behavior normal.           Review of prior notes in Care everywhere/Epic performed by myself:  As mentioned above      ED Course     If labs obtained, they are personally reviewed by myself:     Labs Reviewed   CBC WITH DIFFERENTIAL WITH PLATELET - Abnormal; Notable for the following components:       Result Value    MCV 79.7 (*)     All other components within normal limits   BASIC METABOLIC PANEL (8) - Normal   MAGNESIUM - Normal   TROPONIN I HIGH SENSITIVITY - Normal   POCT PREGNANCY URINE - Normal       If radiologic studies ordered during today's ER visit, my independent interpretation are seen directly below.  This is awaiting the radiologist's final  interpretation.  CT brain, independent interpretation completed by myself, awaiting  formal radiology read: No obvious intracranial hemorrhage.  CTA brain neck , independent interpretation of radiologic study completed by myself and awaiting formal radiologist interpretation: No obvious intracranial bleed      Imaging Results read by radiology in ED:        CT head without contrast  CTA head and neck with IV contrast    IMPRESSION:    Non-contrast Head:  No acute intracranial abnormality. No acute territorial infarct, hemorrhage, mass effect, or midline shift.    CTA Neck:  The bilateral vertebral arteries are widely patent.   Bilateral common carotid and internal carotid arteries are widely patent without significant stenosis.    CTA Head:  The [place] is patent without aneurysm, stenosis, or occlusion.      The results were faxed/finalized only at 0100 ET. If you would like to discuss this case directly please call [phone] (extension [phone]).  If you can't reach me at this number, do not leave a voicemail.  Please call [phone] ext [phone] and ask for the next available Radiologist.    Sarah Andrew M.D.    ED Medications Administered:   Medications   tetracaine (Pontocaine) 0.5 % ophthalmic solution 1 drop (1 drop Ophthalmic Given by Other 2/19/25 2310)   iopamidol 76% (ISOVUE-370) injection for power injector (70 mL Intravenous Given 2/19/25 2355)           Vitals:    02/19/25 2345 02/20/25 0000 02/20/25 0030 02/20/25 0045   BP: (!) 120/98 (!) 150/91 152/83 (!) 136/92   Pulse: 68 58 50 (!) 46   Resp: 14 16 14 13   Temp:       TempSrc:       SpO2: 99% 98% 98% 98%   Weight:         *I personally reviewed and interpreted all ED vitals.    Pulse Ox interpretation by myself: 100%, Room air, Normal     Monitor Interpretation by myself:   normal sinus rhythm    If Ekg obtained during today's visit, it is independently interpreted by myself directly below:  EKG    Rate, intervals and axes as noted on EKG  Report.  Rate: 67  Rhythm: Sinus Rhythm  Reading: no st elevation, no st depression, normal t waves                Medical Record Review: I personally reviewed available prior medical records for any recent pertinent discharge summaries, testing, and procedures and reviewed those reports.      [facility]     Medical decision making/ED Course:   44-year-old female who presents to the emergency department for high blood pressure and headache.  Has had 2 mo of left-sided neck pain and left arm tingling.  Denies chest pain, shortness of breath.  Equal distal pulses, neurologically vascularly intact initially arrived to the emergency department with intermittently elevated blood pressures.  Her symptoms seem most consistent with a nonspecific headache.  I doubt an acute life-threatening pathology.  She is smiling, laughing throughout interview and remains in no distress.  Given elevated blood pressure here in the emergency department , HA and L sided neck pain for 2 mo headache, CT brain , CTA brain/neck obtained and thankfully negative for acute pathology.  Her labs appear stable.  BMP/CBC essentially are normal.  Urine pregnancy test negative.  Troponin negative, EKG nonischemic.  She has no chest pain, shortness of breath to suggest ACS.  Magnesium level normal.  Patient did not require analgesia here in the emergency department.  She was comfortable with discharge    CVA, dissection, meningitis, atypical ACS,  Dysrhythmia, encephalopathy among other life-threatening medical conditions considered and seems unlikely given patient's history, exam, and appearance.  I have encouraged her to check her blood pressures at home and to journal her blood pressures over the next week to discuss need for antihypertensives with her primary care provider      Strict return instructions given.  Patient encouraged to follow-up with primary care provider in the next few days.  Advised to return to the emergency department for any worsening  symptoms    Patient is non toxic appearing, is in no distress, hemodynamically stable.  Pt agrees and is aware of plan.   Smiling upon discharge          Differential Diagnosis:  as listed above in medical decision making.   *Please note that in the presenting to the emergency department, illness/injury that poses a threat to life or function is considered during this patient's initial evaluation.    The complexity of this visit is therefore inherently more complex given the need to consider life threatening pathology prior to any other etiology for this patient's visit.    The differential diagnosis and medical decision above exemplify this rationale.       Medical Decision Making        ED Course as of 02/20/25 0604  ------------------------------------------------------------  Time: 02/19 2245  Comment: Blood pressure 157/102.  Patient is in no distress, smiling  ------------------------------------------------------------  Time: 02/19 2300  Comment: Bp 157/100  ------------------------------------------------------------  Time: 02/20 0000  Comment: Blood pressure spontaneously improving 150/91, she remains in no distress  ------------------------------------------------------------  Time: 02/20 0045  Comment: Blood pressure has remained stable and slightly elevated at 136/92       Vitals:    02/19/25 2345 02/20/25 0000 02/20/25 0030 02/20/25 0045   BP: (!) 120/98 (!) 150/91 152/83 (!) 136/92   Pulse: 68 58 50 (!) 46   Resp: 14 16 14 13   Temp:       TempSrc:       SpO2: 99% 98% 98% 98%   Weight:                 Complicating Factors: Significant medical problems that contribute to the complexity of this emergency room evaluation is listed above.    Condition upon leaving the department: Stable    Disposition and Plan     Clinical Impression:  1. Nonintractable headache, unspecified chronicity pattern, unspecified headache type        Disposition:  Discharge    Medications Prescribed:  Discharge Medication List as  of 2/20/2025 12:59 AM          I have discussed the discharge plan with the patient and/or family or well wisher present in the room with the patient's permission.  They state that they understand and agree with the plan.  All questions regarding their care have been answered prior to discharge.  They are aware: Emergency Department is not intended to be a substitute for an effort to provide complete medical care. The imaging, if any, have often been interpreted on a preliminary basis pending final reading by the radiologist.  Instructed to return immediately to the ED if any changes or worsening of condition should occur.  If patient's blood pressure was greater than 140/90 today, patient encouraged to have this blood pressure rechecked with primary MD and blood pressure education provided.                       [1] (Not in a hospital admission)

## 2025-03-18 ENCOUNTER — MED REC SCAN ONLY (OUTPATIENT)
Dept: PHYSICAL MEDICINE AND REHAB | Facility: CLINIC | Age: 45
End: 2025-03-18

## 2025-04-01 ENCOUNTER — MED REC SCAN ONLY (OUTPATIENT)
Dept: PHYSICAL MEDICINE AND REHAB | Facility: CLINIC | Age: 45
End: 2025-04-01

## 2025-05-28 ENCOUNTER — MED REC SCAN ONLY (OUTPATIENT)
Dept: PHYSICAL MEDICINE AND REHAB | Facility: CLINIC | Age: 45
End: 2025-05-28

## 2025-06-03 ENCOUNTER — TELEPHONE (OUTPATIENT)
Facility: CLINIC | Age: 45
End: 2025-06-03

## 2025-06-04 ENCOUNTER — HOSPITAL ENCOUNTER (OUTPATIENT)
Facility: HOSPITAL | Age: 45
Setting detail: HOSPITAL OUTPATIENT SURGERY
Discharge: HOME | End: 2025-06-04
Attending: INTERNAL MEDICINE | Admitting: INTERNAL MEDICINE
Payer: COMMERCIAL

## 2025-06-04 ENCOUNTER — ANESTHESIA EVENT (OUTPATIENT)
Dept: ENDOSCOPY | Facility: HOSPITAL | Age: 45
End: 2025-06-04
Payer: COMMERCIAL

## 2025-06-04 ENCOUNTER — ANESTHESIA (OUTPATIENT)
Dept: ENDOSCOPY | Facility: HOSPITAL | Age: 45
End: 2025-06-04
Payer: COMMERCIAL

## 2025-06-04 VITALS
DIASTOLIC BLOOD PRESSURE: 89 MMHG | RESPIRATION RATE: 18 BRPM | WEIGHT: 225 LBS | BODY MASS INDEX: 35.31 KG/M2 | HEART RATE: 66 BPM | SYSTOLIC BLOOD PRESSURE: 130 MMHG | OXYGEN SATURATION: 98 % | HEIGHT: 67 IN

## 2025-06-04 DIAGNOSIS — Z12.11 COLON CANCER SCREENING: ICD-10-CM

## 2025-06-04 PROBLEM — K63.5 POLYP OF COLON: Status: ACTIVE | Noted: 2025-06-04

## 2025-06-04 PROCEDURE — 45380 COLONOSCOPY AND BIOPSY: CPT | Performed by: INTERNAL MEDICINE

## 2025-06-04 PROCEDURE — 45385 COLONOSCOPY W/LESION REMOVAL: CPT | Performed by: INTERNAL MEDICINE

## 2025-06-04 RX ORDER — SODIUM CHLORIDE, SODIUM LACTATE, POTASSIUM CHLORIDE, CALCIUM CHLORIDE 600; 310; 30; 20 MG/100ML; MG/100ML; MG/100ML; MG/100ML
INJECTION, SOLUTION INTRAVENOUS CONTINUOUS
Status: DISCONTINUED | OUTPATIENT
Start: 2025-06-04 | End: 2025-06-04

## 2025-06-04 RX ORDER — LIDOCAINE HYDROCHLORIDE 10 MG/ML
INJECTION, SOLUTION EPIDURAL; INFILTRATION; INTRACAUDAL; PERINEURAL AS NEEDED
Status: DISCONTINUED | OUTPATIENT
Start: 2025-06-04 | End: 2025-06-04 | Stop reason: SURG

## 2025-06-04 RX ORDER — NALOXONE HYDROCHLORIDE 0.4 MG/ML
80 INJECTION, SOLUTION INTRAMUSCULAR; INTRAVENOUS; SUBCUTANEOUS AS NEEDED
Status: DISCONTINUED | OUTPATIENT
Start: 2025-06-04 | End: 2025-06-04

## 2025-06-04 RX ADMIN — LIDOCAINE HYDROCHLORIDE 50 MG: 10 INJECTION, SOLUTION EPIDURAL; INFILTRATION; INTRACAUDAL; PERINEURAL at 07:50:00

## 2025-06-04 RX ADMIN — SODIUM CHLORIDE, SODIUM LACTATE, POTASSIUM CHLORIDE, CALCIUM CHLORIDE: 600; 310; 30; 20 INJECTION, SOLUTION INTRAVENOUS at 07:46:00

## 2025-06-04 NOTE — DISCHARGE INSTRUCTIONS
Home Care Instructions for Colonoscopy with Sedation    Diet:  - Resume your regular diet as tolerated unless otherwise instructed.  - Start with light meals to minimize bloating.  - Do not drink alcohol today.    Medication:  - If you have questions about resuming your normal medications, please contact your Primary Care Physician.    Activities:  - Take it easy today. Do not return to work today.  - Do not drive today.  - Do not operate any machinery today (including kitchen equipment).  -   Do not make any critical decisions or sign any paperwork.  - Do not exercise today.    Colonoscopy:  - You may notice some rectal \"spotting\" (a little blood on the toilet tissue) for a day or two after the exam. This is normal.  - If you experience any rectal bleeding (not spotting), persistent tenderness or sharp severe abdominal pains, oral temperature over 100 degrees Fahrenheit, light-headedness or dizziness, or any other problems, contact your doctor.      **If unable to reach your doctor, please go to the [facility] Emergency Room**    - Your referring physician will receive a full report of your examination.  - If you do not hear from your doctor's office within two weeks of your biopsy, please call them for your results.    You may be able to see your laboratory results in [employer] between 4 and 7 business days.  In some cases, your physician may not have viewed the results before they are released to [employer].  If you have questions regarding your results contact the physician who ordered the test/exam by phone or via [employer] by choosing \"Ask a Medical Question.\"

## 2025-06-04 NOTE — ANESTHESIA POSTPROCEDURE EVALUATION
Patient: Isacc Hope    Procedure Summary       Date: 06/04/25 Room / Location: [facility] ENDOSCOPY 04 / EM ENDOSCOPY    Anesthesia Start: 0746 Anesthesia Stop: 0834    Procedure: COLONOSCOPY Diagnosis:       Colon cancer screening      (colon  polyp, hemorrhoids)    Surgeons: Vika Bagley MD Anesthesiologist: Yahaira Peters DO    Anesthesia Type: MAC ASA Status: 2            Anesthesia Type: MAC    Vitals Value Taken Time   /106 06/04/25 08:35   Temp  06/04/25 08:35   Pulse 78 06/04/25 08:35   Resp 20 06/04/25 08:35   SpO2 98% 06/04/25 08:35       EM AN Post Evaluation:   Patient Evaluated in PACU  Patient Participation: complete - patient participated  Level of Consciousness: awake  Pain Score: 0  Pain Management: adequate  Airway Patency:patent  Dental exam unchanged from preop  Yes    Nausea/Vomiting: none  Cardiovascular Status: hemodynamically stable  Respiratory Status: spontaneous ventilation, unassisted, room air and nonlabored ventilation  Postoperative Hydration stable      Yahaira Peters DO  6/4/2025 8:35 AM

## 2025-06-04 NOTE — OPERATIVE REPORT
COLONOSCOPY REPORT    Isacc Hope     1980 Age 44 year old   PCP Marcos Briceno MD Endoscopist Vika Bagley MD     Date of procedure: 25    Procedure: Colonoscopy w/ cold snare and cold biopsy    Pre-operative diagnosis: CRC screening     Post-operative diagnosis: colon polyp, colon diverticulosis, internal hemorrhoids, distal rectal erythema    Medications: MAC    Withdrawal time: 31 minutes    Procedure:  Informed consent was obtained from the patient after the risks of the procedure were discussed, including but not limited to bleeding, perforation, aspiration, infection, or possibility of a missed lesion. After discussions of the risks/benefits and alternatives to this procedure, as well as the planned sedation, the patient was placed in the left lateral decubitus position and begun on continuous blood pressure pulse oximetry and EKG monitoring and this was maintained throughout the procedure. Once an adequate level of sedation was obtained a digital rectal exam was completed. Then the lubricated tip of the [id number]-[id number] diagnostic video colonoscope was inserted and advanced without difficulty to the cecum using the CO2 insufflation technique. The cecum was identified by localizing the trifold, the appendix and the ileocecal valve. Withdrawal was begun with thorough washing and careful examination of the colonic walls and folds. A routine second examination of the cecum/ascending colon was performed. Photodocumentation was obtained. The bowel prep was good. Views of the colon were good with washing. I then carefully withdrew the instrument from the patient who tolerated the procedure well.     Complications: none.    Findings:   1. 1 polyp noted as follows:      A. 5 mm polyp in the sigmoid colon; sessile morphology; completely removed by cold snare polypectomy and retrieved.    2. Diverticulosis: mild in the sigmoid and descending colon.    3. Terminal ileum: the visualized mucosa  appeared unremarkable.    4. A retroflexed view of the rectum revealed small internal hemorrhoids and a localized area of redness just proximal to the dentate line that was sampled with cold forceps biopsies.    5. Otherwise, the colonic mucosa throughout the colon was carefully examined and showed normal vascular pattern, without evidence of angioectasias or inflammation.     6. KARLY: normal rectal tone, no masses palpated.     Impression:   One small colon polyp, resected and retrieved.  Mild left colon diverticulosis.  Small internal hemorrhoids.   Localized area of erythema just proximal to the dentate line in the rectum. Biopsied.     Recommend:  Await pathology. The interval for the next colonoscopy will be determined after reviewing pathology. If new signs or symptoms develop, colonoscopy may need to be repeated sooner.   High fiber diet.  Monitor for blood in the stool. If having more than just tinge of blood, call office or go to the ER.  Follow up in GI clinic in 2-4 weeks.     >>>If tissue was obtained and you have not received your pathology results either by phone or letter within 2 weeks, please call our office at [phone].    Specimens: sigmoid colon polyp, rectal erythema     Blood loss: <1 ml

## 2025-06-04 NOTE — H&P
History & Physical Examination    Patient Name: Isacc Hope  MRN: [id number]  CSN: [id number]  YOB: 1980    Diagnosis: CRC screening     Prescriptions Prior to Admission[1]  Current Hospital Medications[2]    Allergies: Allergies[3]    Past Medical History[4]  Past Surgical History[5]  Family History[6]  Social History     Tobacco Use    Smoking status: Some Days     Types: Cigarettes     Passive exposure: Never    Smokeless tobacco: Never   Substance Use Topics    Alcohol use: Yes     Comment: occ         SYSTEM Check if Review is Normal Check if Physical Exam is Normal If not normal, please explain:   HEENT [X ] [ X]    NECK  [X ] [ X]    HEART [X ] [ X]    LUNGS [X ] [ X]    ABDOMEN [X ] [ X]    EXTREMITIES [X ] [ X]    OTHER        I have discussed the risks and benefits and alternatives of the procedure with the patient/family.  They understand and agree to proceed with plan of care.   I have reviewed the History and Physical done within the last 30 days.  Any changes noted above.    Vika Bagley MD  [facility] - Gastroenterology  6/4/2025               [1]   Medications Prior to Admission   Medication Sig Dispense Refill Last Dose/Taking    valACYclovir 500 MG Oral Tab Take 1 tablet (500 mg total) by mouth daily. 30 tablet 14 5/21/2025    gabapentin 100 MG Oral Cap Take 1 capsule (100 mg total) by mouth 3 (three) times daily as needed.   Taking As Needed    azelastine 0.1 % Nasal Solution 2 sprays by Nasal route 2 (two) times daily. 30 mL 3 6/2/2025    montelukast 10 MG Oral Tab Take 1 tablet (10 mg total) by mouth nightly. 30 tablet 3 5/21/2025    Ergocalciferol (VITAMIN D OR) Take by mouth once a week.   5/21/2025    polyethylene glycol, PEG 3350-KCl-NaBcb-NaCl-NaSulf, 236 g Oral Recon Soln Take 4,000 mL by mouth As Directed. Take 2,000 mL the night before your procedure and 2,000 mL the morning of your procedure. Take as directed by GI clinic. Okay to substitute for generic.  1 each 0    [2]   Current Facility-Administered Medications   Medication Dose Route Frequency    lactated ringers infusion   Intravenous Continuous   [3] No Known Allergies  [4]   Past Medical History:   Abnormal weight gain    Acute pharyngitis    Acute venous embolism and thrombosis of unspecified deep vessels of lower extremity        Bunion    Constipation    couple years ago    Elevated blood pressure reading without diagnosis of hypertension    Hemorrhoids    History of blood clots    Other pulmonary embolism and infarction    After hysterectomy. Rx'd with blood thinner for 6 months    Pulmonary embolism (HCC)        Sprain and strain of unspecified site of knee and leg    Unspecified vitamin D deficiency    Visual impairment    glasses   [5]   Past Surgical History:  Procedure Laterality Date    Cholecystectomy      at age 21    Hysterectomy  in 2012     abdominal hysterectomy  pt still has fallobian tubes and ovaries     Needle biopsy left  10/29/2020    US bx, benign          Total abdom hysterectomy  Dec. 2012   [6]   Family History  Problem Relation Age of Onset    Diabetes Father     Heart Disease Mother     Heart Disease Other     Cancer Other     Diabetes Other     Hypertension Other     Ovarian Cancer Maternal Cousin Female 50    Pancreatic Cancer Maternal Cousin Female 50

## 2025-06-04 NOTE — ANESTHESIA PREPROCEDURE EVALUATION
Anesthesia PreOp Note    HPI:     Isacc Hope is a 44 year old female who presents for preoperative consultation requested by: Vika Bagley MD    Date of Surgery: 6/4/2025    Procedure(s):  COLONOSCOPY  Indication: Colon cancer screening    Relevant Problems   No relevant active problems       NPO:  Last Liquid Consumption Date: 06/04/25  Last Liquid Consumption Time: 0500  Last Solid Consumption Date: 06/02/25  Last Solid Consumption Time: 1800  Last Liquid Consumption Date: 06/04/25          History Review:  Patient Active Problem List    Diagnosis Date Noted    History of DVT in adulthood 02/17/2025    Flu vaccine refused 02/17/2025    Acute deep vein thrombosis (DVT) of calf muscle vein of left lower extremity (HCC) 11/17/2022    Neck pain 06/22/2022    Numbness and tingling 06/22/2022    Dysphagia 01/30/2020    Throat pain 01/30/2020       Past Medical History[1]    Past Surgical History[2]    Prescriptions Prior to Admission[3]  Current Medications and Prescriptions Ordered in Epic[4]    Allergies[5]    Family History[6]  Social Hx on file[7]    Available pre-op labs reviewed.             Vital Signs:  Body mass index is 35.24 kg/m².   height is 1.702 m (5' 7\") and weight is 102.1 kg (225 lb). Her blood pressure is 146/96 (abnormal) and her pulse is 63. Her respiration is 10 and oxygen saturation is 95%.   Vitals:    05/28/25 1749 06/04/25 0713   BP:  (!) 146/96   Pulse:  63   Resp:  10   SpO2:  95%   Weight: 102.1 kg (225 lb)    Height: 1.702 m (5' 7\")         Anesthesia Evaluation      No history of anesthetic complications   Airway   Mallampati: II  TM distance: >3 FB  Neck ROM: full  Dental - Dentition appears grossly intact     Pulmonary - normal exam   (-) shortness of breath, recent URI    ROS comment: Active smoker of cannabis \"now and then\" for 10-15 yrs   Hx of PE   Cardiovascular - negative ROS  Exercise tolerance: good  (-) hypertension, dysrhythmias, angina    Rhythm: regular  Rate:  normal    Neuro/Psych - negative ROS   (-) seizures, neuromuscular disease    GI/Hepatic/Renal    (+) bowel prep  (-) hepatitis, liver disease, renal disease    Endo/Other - negative ROS   (-) diabetes mellitus, blood dyscrasia  Abdominal  - normal exam                 Anesthesia Plan:   ASA:  2  Plan:   MAC  Informed Consent Plan and Risks Discussed With:  Patient  Discussed plan with:  Surgeon      I have informed Isacc Hope and/or legal guardian or family member of the nature of the anesthetic plan, benefits, risks including possible dental damage if relevant, major complications, and any alternative forms of anesthetic management.   All of the patient's questions were answered to the best of my ability. The patient desires the anesthetic management as planned.  Yahaira Peters,   2025 7:37 AM  Present on Admission:  **None**           [1]   Past Medical History:   Abnormal weight gain    Acute pharyngitis    Acute venous embolism and thrombosis of unspecified deep vessels of lower extremity        Bunion    Constipation    couple years ago    Elevated blood pressure reading without diagnosis of hypertension    Hemorrhoids    History of blood clots    Other pulmonary embolism and infarction    After hysterectomy. Rx'd with blood thinner for 6 months    Pulmonary embolism (HCC)        Sprain and strain of unspecified site of knee and leg    Unspecified vitamin D deficiency    Visual impairment    glasses   [2]   Past Surgical History:  Procedure Laterality Date    Cholecystectomy      at age 21    Hysterectomy  in      abdominal hysterectomy  pt still has fallobian tubes and ovaries     Needle biopsy left  10/29/2020    US bx, benign          Total abdom hysterectomy  Dec. 2012   [3]   Medications Prior to Admission   Medication Sig Dispense Refill Last Dose/Taking    valACYclovir 500 MG Oral Tab Take 1 tablet (500 mg total) by mouth daily. 30 tablet 14 2025     gabapentin 100 MG Oral Cap Take 1 capsule (100 mg total) by mouth 3 (three) times daily as needed.   Taking As Needed    azelastine 0.1 % Nasal Solution 2 sprays by Nasal route 2 (two) times daily. 30 mL 3 6/2/2025    montelukast 10 MG Oral Tab Take 1 tablet (10 mg total) by mouth nightly. 30 tablet 3 5/21/2025    Ergocalciferol (VITAMIN D OR) Take by mouth once a week.   5/21/2025    polyethylene glycol, PEG 3350-KCl-NaBcb-NaCl-NaSulf, 236 g Oral Recon Soln Take 4,000 mL by mouth As Directed. Take 2,000 mL the night before your procedure and 2,000 mL the morning of your procedure. Take as directed by GI clinic. Okay to substitute for generic. 1 each 0    [4]   Current Facility-Administered Medications Ordered in Epic   Medication Dose Route Frequency Provider Last Rate Last Admin    lactated ringers infusion   Intravenous Continuous Vika Bagley MD         No current [facility]-ordered outpatient medications on file.   [5] No Known Allergies  [6]   Family History  Problem Relation Age of Onset    Diabetes Father     Heart Disease Mother     Heart Disease Other     Cancer Other     Diabetes Other     Hypertension Other     Ovarian Cancer Maternal Cousin Female 50    Pancreatic Cancer Maternal Cousin Female 50   [7]   Social History  Socioeconomic History    Marital status: Single   Tobacco Use    Smoking status: Some Days     Types: Cigarettes     Passive exposure: Never    Smokeless tobacco: Never   Vaping Use    Vaping status: Never Used   Substance and Sexual Activity    Alcohol use: Yes     Comment: occ    Drug use: Yes     Types: Cannabis     Comment: occ    Sexual activity: Not Currently     Partners: Male

## (undated) DEVICE — 60 ML SYRINGE REGULAR TIP: Brand: MONOJECT

## (undated) DEVICE — LASSO POLYPECTOMY SNARE: Brand: LASSO

## (undated) DEVICE — KIT ENDO ORCAPOD 160/180/190

## (undated) DEVICE — KIT CLEAN ENDOKIT 1.1OZ GOWNX2

## (undated) DEVICE — MEDI-VAC NON-CONDUCTIVE SUCTION TUBING 6MM X 1.8M (6FT.) L: Brand: CARDINAL HEALTH

## (undated) DEVICE — Device

## (undated) DEVICE — GIJAW SINGLE-USE BIOPSY FORCEPS WITH NEEDLE: Brand: GIJAW

## (undated) DEVICE — V2 SPECIMEN COLLECTION MANIFOLD KIT: Brand: NEPTUNE

## (undated) DEVICE — V2 SPECIMEN COLLECTION TRAY: Brand: NEPTUNE

## (undated) NOTE — ED AVS SNAPSHOT
Araceli Galvin   MRN: [id number]    Department:  [facility] Emergency Department   Date of Visit:  3/30/2018           Disclosure     Insurance plans vary and the physician(s) referred by the ER may not be covered by your plan.  Please contact CARE PHYSICIAN AT ONCE OR RETURN IMMEDIATELY TO THE EMERGENCY DEPARTMENT. If you have been prescribed any medication(s), please fill your prescription right away and begin taking the medication(s) as directed.   If you believe that any of the medications

## (undated) NOTE — LETTER
[facility]  Administration of Anesthesia  I, Isacc Hope agree to be cared for by a physician anesthesiologist alone and/or with a nurse anesthetist, who is specially trained to monitor me and give me medicine to put me to sleep or keep me comfortable during my procedure    I understand that my anesthesiologist and/or anesthetist is not an employee or agent of [employer] or [employer]. He or she works for [facility]    As the patient asking for anesthesia services, I agree to:  Allow the anesthesiologist (anesthesia doctor) to give me medicine and do additional procedures as necessary. Some examples are: Starting or using an “IV” to give me medicine, fluids or blood during my procedure, and having a breathing tube placed to help me breathe when I’m asleep (intubation). In the event that my heart stops working properly, I understand that my anesthesiologist will make every effort to sustain my life, unless otherwise directed by [employer] Do Not Resuscitate documents.  Tell my anesthesia doctor before my procedure:  If I am pregnant.  The last time that I ate or drank.  iii. All of the medicines I take (including prescriptions, herbal supplements, and pills I can buy without a prescription (including street drugs/illegal medications). Failure to inform my anesthesiologist about these medicines may increase my risk of anesthetic complications.  iv.If I am allergic to anything or have had a reaction to anesthesia before.  I understand how the anesthesia medicine will help me (benefits).  I understand that with any type of anesthesia medicine there are risks:  The most common risks are: nausea, vomiting, sore throat, muscle soreness, damage to my eyes, mouth, or teeth (from breathing tube placement).  Rare risks include: remembering what happened during my procedure, allergic reactions to medications, injury to my airway, heart, lungs, vision, nerves, or  muscles and in extremely rare instances death.  My doctor has explained to me other choices available to me for my care (alternatives).  Pregnant Patients (“epidural”):  I understand that the risks of having an epidural (medicine given into my back to help control pain during labor), include itching, low blood pressure, difficulty urinating, headache or slowing of the baby’s heart. Very rare risks include infection, bleeding, seizure, irregular heart rhythms and nerve injury.  Regional Anesthesia (“spinal”, “epidural”, & “nerve blocks”):  I understand that rare but potential complications include headache, bleeding, infection, seizure, irregular heart rhythms, and nerve injury.    _____________________________________________________________________________  Patient (or Representative) Signature/Relationship to Patient  Date   Time    _____________________________________________________________________________   Name (if used)    Language/Organization   Time    _____________________________________________________________________________  Nurse Anesthetist Signature     Date   Time  _____________________________________________________________________________  Anesthesiologist Signature     Date   Time  I have discussed the procedure and information above with the patient (or patient’s representative) and answered their questions. The patient or their representative has agreed to have anesthesia services.    _____________________________________________________________________________  Witness        Date   Time  I have verified that the signature is that of the patient or patient’s representative, and that it was signed before the procedure  Patient Name: Isacc Hope     : 1980                 Printed: 2025 at 12:14 PM    Medical Record #: [id number]                                            Page 1 of 1  ----------ANESTHESIA CONSENT----------

## (undated) NOTE — ED AVS SNAPSHOT
Tuyet Lesly   MRN: [id number]    Department:  [facility] Emergency Department   Date of Visit:  1/24/2020           Disclosure     Insurance plans vary and the physician(s) referred by the ER may not be covered by your plan.  Please contact CARE PHYSICIAN AT ONCE OR RETURN IMMEDIATELY TO THE EMERGENCY DEPARTMENT. If you have been prescribed any medication(s), please fill your prescription right away and begin taking the medication(s) as directed.   If you believe that any of the medications

## (undated) NOTE — LETTER
[facility]  [address], [place], [place]    Authorization for Surgical Operation and Procedure                               I hereby authorize Vika Bagley MD, my physician and his/her assistants (if applicable), which may include medical students, residents, and/or fellows, to perform the following surgical operation/ procedure and administer such anesthesia as may be determined necessary by my physician: Operation/Procedure name (s) COLONOSCOPY on Isacc Hope   2.   I recognize that during the surgical operation/procedure, unforeseen conditions may necessitate additional or different procedures than those listed above.  I, therefore, further authorize and request that the above-named surgeon, assistants, or designees perform such procedures as are, in their judgment, necessary and desirable.    3.   My surgeon/physician has discussed prior to my surgery the potential benefits, risks and side effects of this procedure; the likelihood of achieving goals; and potential problems that might occur during recuperation.  They also discussed reasonable alternatives to the procedure, including risks, benefits, and side effects related to the alternatives and risks related to not receiving this procedure.  I have had all my questions answered and I acknowledge that no guarantee has been made as to the result that may be obtained.    4.   Should the need arise during my operation/procedure, which includes change of level of care prior to discharge, I also consent to the administration of blood and/or blood products.  Further, I understand that despite careful testing and screening of blood or blood products by collecting agencies, I may still be subject to ill effects as a result of receiving a blood transfusion and/or blood products.  The following are some, but not all, of the potential risks that can occur: fever and allergic reactions, hemolytic reactions, transmission of diseases such as  Hepatitis, AIDS and Cytomegalovirus (CMV) and fluid overload.  In the event that I wish to have an autologous transfusion of my own blood, or a directed donor transfusion, I will discuss this with my physician.  Check only if Refusing Blood or Blood Products  I understand refusal of blood or blood products as deemed necessary by my physician may have serious consequences to my condition to include possible death. I hereby assume responsibility for my refusal and release the hospital, its personnel, and my physicians from any responsibility for the consequences of my refusal.    o  Refuse   5.   I authorize the use of any specimen, organs, tissues, body parts or foreign objects that may be removed from my body during the operation/procedure for diagnosis, research or teaching purposes and their subsequent disposal by hospital authorities.  I also authorize the release of specimen test results and/or written reports to my treating physician on the hospital medical staff or other referring or consulting physicians involved in my care, at the discretion of the Pathologist or my treating physician.    6.   I consent to the photographing or videotaping of the operations or procedures to be performed, including appropriate portions of my body for medical, scientific, or educational purposes, provided my identity is not revealed by the pictures or by descriptive texts accompanying them.  If the procedure has been photographed/videotaped, the surgeon will obtain the original picture, image, videotape or CD.  The hospital will not be responsible for storage, release or maintenance of the picture, image, tape or CD.    7.   I consent to the presence of a  or observers in the operating room as deemed necessary by my physician or their designees.    8.   I recognize that in the event my procedure results in extended X-Ray/fluoroscopy time, I may develop a skin reaction.    9. If I have a Do Not Attempt  Resuscitation (DNAR) order in place, that status will be suspended while in the operating room, procedural suite, and during the recovery period unless otherwise explicitly stated by me (or a person authorized to consent on my behalf). The surgeon or my attending physician will determine when the applicable recovery period ends for purposes of reinstating the DNAR order.  10. Patients having a sterilization procedure: I understand that if the procedure is successful the results will be permanent and it will therefore be impossible for me to inseminate, conceive, or bear children.  I also understand that the procedure is intended to result in sterility, although the result has not been guaranteed.   11. I acknowledge that my physician has explained sedation/analgesia administration to me including the risk and benefits I consent to the administration of sedation/analgesia as may be necessary or desirable in the judgment of my physician.    I CERTIFY THAT I HAVE READ AND FULLY UNDERSTAND THE ABOVE CONSENT TO OPERATION and/or OTHER PROCEDURE.     ____________________________________  _________________________________        ______________________________  Signature of Patient    Signature of Responsible Person                Printed Name of Responsible Person                                      ____________________________________  _____________________________                ________________________________  Signature of Witness        Date  Time         Relationship to Patient    STATEMENT OF PHYSICIAN My signature below affirms that prior to the time of the procedure; I have explained to the patient and/or his/her legal representative, the risks and benefits involved in the proposed treatment and any reasonable alternative to the proposed treatment. I have also explained the risks and benefits involved in refusal of the proposed treatment and alternatives to the proposed treatment and have answered the patient's  questions. If I have a significant financial interest in a co-management agreement or a significant financial interest in any product or implant, or other significant relationship used in this procedure/surgery, I have disclosed this and had a discussion with my patient.     _____________________________________________________              _____________________________  (Signature of Physician)                                                                                         (Date)                                   (Time)  Patient Name: Isacc Hope      : 1980      Printed: 2025     Medical Record #: [id number]                                      Page 1 of 1

## (undated) NOTE — LETTER
[address] [place] [address]  Authorization for Surgical Operation or Procedure  Date: ______________       Time: _______________  1.  I hereby authorize  , my physician and the assistant, to perform the following operation an 5. I consent to the photographing of the operations or procedures to be performed for the purposes of advancing medicine, science, and/or education, provided my identity is not revealed.  If the procedure has been videotaped, the physician/surgeon will obta Statement of Physician: My signature below affirms that prior to the time of the procedure, I have explained to the patient and/or her guardian, the risks and benefits involved in the proposed treatment and any reasonable alternative to the proposed treatm

## (undated) NOTE — LETTER
[address], [place]  Authorization for Invasive Procedures  1.  I hereby authorize Dr. Tao Prescott , my physician and whomever may be designated as the doctor's assistant, to perform the following operation and/or procedure:  Ultra 4. Should the need arise during my operation or immediate post-operative period; I also consent to the administration of blood and/or blood products.  Further, I understand that despite careful testing and screening of blood and blood products, I may still 9. Patients having a sterilization procedure: I understand that if the procedure is successful the results will be permanent and it will therefore be impossible for me to inseminate, conceive or bear children.  I also understand that the procedure is intend

## (undated) NOTE — LETTER
Martin العراقي, [address] [facility], [place], [facility]       02/27/20        Patient: Leonora Mireles   YOB: 1980   Date of Visit: 2/27/2020       Dear  Dr. Srinivasa Rogers MD,      Thank you for referring Piedad Gonzales